# Patient Record
Sex: FEMALE | Race: WHITE | NOT HISPANIC OR LATINO | Employment: FULL TIME | URBAN - METROPOLITAN AREA
[De-identification: names, ages, dates, MRNs, and addresses within clinical notes are randomized per-mention and may not be internally consistent; named-entity substitution may affect disease eponyms.]

---

## 2019-01-23 ENCOUNTER — OFFICE VISIT (OUTPATIENT)
Dept: URGENT CARE | Facility: CLINIC | Age: 24
End: 2019-01-23
Payer: COMMERCIAL

## 2019-01-23 VITALS
WEIGHT: 200 LBS | TEMPERATURE: 98 F | OXYGEN SATURATION: 100 % | DIASTOLIC BLOOD PRESSURE: 78 MMHG | RESPIRATION RATE: 16 BRPM | SYSTOLIC BLOOD PRESSURE: 130 MMHG | HEART RATE: 86 BPM | HEIGHT: 68 IN | BODY MASS INDEX: 30.31 KG/M2

## 2019-01-23 DIAGNOSIS — R06.02 SOB (SHORTNESS OF BREATH): Primary | ICD-10-CM

## 2019-01-23 PROCEDURE — 99203 OFFICE O/P NEW LOW 30 MIN: CPT | Performed by: PHYSICIAN ASSISTANT

## 2019-01-23 RX ORDER — NORETHINDRONE ACETATE AND ETHINYL ESTRADIOL 1MG-20(21)
1 KIT ORAL DAILY
COMMUNITY

## 2019-01-23 RX ORDER — OMEPRAZOLE 40 MG/1
40 CAPSULE, DELAYED RELEASE ORAL DAILY
COMMUNITY
End: 2022-05-08

## 2019-01-23 NOTE — PROGRESS NOTES
[facility]        NAME: Artur Srivastava is a 21 y o  female  : 1995    MRN: [id number]  DATE: 2019  TIME: 10:24 AM    Assessment and Plan   SOB (shortness of breath) [R06 02]  1  SOB (shortness of breath)  Transfer to other facility     Patient Instructions     Patient to reading ED for further evaluation    Chief Complaint     Chief Complaint   Patient presents with    Shortness of Breath     had an egd yesterday and received profanol  automatically after getting profanol got sob  It never went away  History of Present Illness     Patient had EGD yesterday  Patient has experienced burning in chest and inability to take full breath since procedure  Patient states that she called anesthesiologist and he instructed her to go to ED for workup due to possible aspiration  Patient presents here instead as sx have not resolved  Shortness of Breath   This is a new problem  The current episode started yesterday  The problem occurs constantly  The problem has been unchanged  Pertinent negatives include no abdominal pain, chest pain, claudication, coryza, ear pain, fever, headaches, hemoptysis, leg pain, leg swelling, neck pain, orthopnea, PND, rash, rhinorrhea, sore throat, sputum production, swollen glands, syncope, vomiting or wheezing  Nothing aggravates the symptoms  She has tried beta agonist inhalers for the symptoms  The treatment provided no relief  There is no history of allergies, aspirin allergies, asthma, bronchiolitis, CAD, chronic lung disease, COPD, DVT, a heart failure, PE, pneumonia or a recent surgery  Review of Systems   Review of Systems   Constitutional: Negative for activity change, appetite change, chills, diaphoresis, fatigue, fever and unexpected weight change  HENT: Negative for ear pain, rhinorrhea and sore throat  Respiratory: Positive for chest tightness and shortness of breath   Negative for apnea, cough, hemoptysis, sputum production, choking, wheezing and stridor  Cardiovascular: Negative for chest pain, orthopnea, claudication, leg swelling, syncope and PND  Gastrointestinal: Negative for abdominal pain and vomiting  Musculoskeletal: Negative for neck pain  Skin: Negative for rash  Neurological: Negative for headaches  Current Medications       Current Outpatient Prescriptions:     norethindrone-ethinyl estradiol (JUNEL FE 1/20) 1-20 MG-MCG per tablet, Take 1 tablet by mouth daily, Disp: , Rfl:     omeprazole (PriLOSEC) 40 MG capsule, Take 40 mg by mouth daily, Disp: , Rfl:     Current Allergies     Allergies as of 01/23/2019 - never reviewed   Allergen Reaction Noted    Sulfa antibiotics Hives 01/23/2019            The following portions of the patient's history were reviewed and updated as appropriate: allergies, current medications, past family history, past medical history, past social history, past surgical history and problem list      Past Medical History:   Diagnosis Date    Endometriosis     Peptic ulcer        Past Surgical History:   Procedure Laterality Date    ADENOIDECTOMY      APPENDECTOMY      TONSILLECTOMY         Family History   Problem Relation Age of Onset    No Known Problems Mother     No Known Problems Father      Medications have been verified  Objective   /78   Pulse 86   Temp 98 °F (36 7 °C) (Tympanic)   Resp 16   Ht 5' 8" (1 727 m)   Wt 90 7 kg (200 lb)   LMP 11/23/2018   SpO2 100%   BMI 30 41 kg/m²        Physical Exam     Physical Exam   Constitutional: She appears well-developed and well-nourished  Cardiovascular: Normal rate, regular rhythm and intact distal pulses  Exam reveals no gallop and no friction rub  No murmur heard  Pulmonary/Chest: Effort normal  No respiratory distress  She has no decreased breath sounds  She has wheezes (scattered)  She has no rhonchi  She has no rales     Friction rub auscultated at bases of lungs; patient unable to take full inspiration   Abdominal: Soft  Bowel sounds are normal  She exhibits no distension  There is no tenderness  There is no rebound and no guarding

## 2020-09-19 ENCOUNTER — HOSPITAL ENCOUNTER (EMERGENCY)
Facility: HOSPITAL | Age: 25
Discharge: HOME | End: 2020-09-19
Attending: EMERGENCY MEDICINE
Payer: COMMERCIAL

## 2020-09-19 VITALS
BODY MASS INDEX: 38.58 KG/M2 | RESPIRATION RATE: 18 BRPM | HEART RATE: 103 BPM | TEMPERATURE: 97.1 F | DIASTOLIC BLOOD PRESSURE: 98 MMHG | OXYGEN SATURATION: 98 % | WEIGHT: 253.75 LBS | SYSTOLIC BLOOD PRESSURE: 135 MMHG
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
## 2020-09-19 DIAGNOSIS — S61.451A ANIMAL BITE OF RIGHT HAND WITH INFECTION, INITIAL ENCOUNTER: ICD-10-CM

## 2020-09-19 DIAGNOSIS — L08.9 ANIMAL BITE OF RIGHT HAND WITH INFECTION, INITIAL ENCOUNTER: ICD-10-CM

## 2020-09-19 PROCEDURE — 99283 EMERGENCY DEPT VISIT LOW MDM: CPT

## 2020-09-19 PROCEDURE — 99284 EMERGENCY DEPT VISIT MOD MDM: CPT | Performed by: EMERGENCY MEDICINE

## 2020-09-19 RX ORDER — OXYCODONE HYDROCHLORIDE AND ACETAMINOPHEN 5; 325 MG/1; MG/1
1 TABLET ORAL ONCE
Status: COMPLETED | OUTPATIENT
Start: 2020-09-19 | End: 2020-09-19

## 2020-09-19 RX ORDER — AMOXICILLIN AND CLAVULANATE POTASSIUM 875; 125 MG/1; MG/1
1 TABLET, FILM COATED ORAL ONCE
Status: COMPLETED | OUTPATIENT
Start: 2020-09-19 | End: 2020-09-19

## 2020-09-19 RX ORDER — AMOXICILLIN AND CLAVULANATE POTASSIUM 875; 125 MG/1; MG/1
1 TABLET, FILM COATED ORAL EVERY 12 HOURS
Qty: 20 TABLET | Refills: 0 | Status: SHIPPED | OUTPATIENT
Start: 2020-09-19 | End: 2020-09-19 | Stop reason: SDUPTHER

## 2020-09-19 RX ADMIN — AMOXICILLIN AND CLAVULANATE POTASSIUM 1 TABLET: 875; 125 TABLET, FILM COATED ORAL at 23:14

## 2020-09-19 RX ADMIN — OXYCODONE HYDROCHLORIDE AND ACETAMINOPHEN 1 TABLET: 5; 325 TABLET ORAL at 23:17

## 2021-04-15 ENCOUNTER — IMMUNIZATIONS (OUTPATIENT)
Dept: FAMILY MEDICINE CLINIC | Facility: HOSPITAL | Age: 26
End: 2021-04-15

## 2021-04-15 DIAGNOSIS — Z23 ENCOUNTER FOR IMMUNIZATION: Primary | ICD-10-CM

## 2021-04-15 PROCEDURE — 91300 SARS-COV-2 / COVID-19 MRNA VACCINE (PFIZER-BIONTECH) 30 MCG: CPT

## 2021-04-15 PROCEDURE — 0001A SARS-COV-2 / COVID-19 MRNA VACCINE (PFIZER-BIONTECH) 30 MCG: CPT

## 2021-05-06 ENCOUNTER — IMMUNIZATIONS (OUTPATIENT)
Dept: FAMILY MEDICINE CLINIC | Facility: HOSPITAL | Age: 26
End: 2021-05-06

## 2021-05-06 DIAGNOSIS — Z23 ENCOUNTER FOR IMMUNIZATION: Primary | ICD-10-CM

## 2021-05-06 PROCEDURE — 0002A SARS-COV-2 / COVID-19 MRNA VACCINE (PFIZER-BIONTECH) 30 MCG: CPT

## 2021-05-06 PROCEDURE — 91300 SARS-COV-2 / COVID-19 MRNA VACCINE (PFIZER-BIONTECH) 30 MCG: CPT

## 2021-11-21 ENCOUNTER — OFFICE VISIT (OUTPATIENT)
Dept: URGENT CARE | Facility: CLINIC | Age: 26
End: 2021-11-21
Payer: COMMERCIAL

## 2021-11-21 VITALS
WEIGHT: 250 LBS | RESPIRATION RATE: 16 BRPM | HEART RATE: 110 BPM | OXYGEN SATURATION: 96 % | BODY MASS INDEX: 37.89 KG/M2 | TEMPERATURE: 99.1 F | HEIGHT: 68 IN

## 2021-11-21 DIAGNOSIS — J01.00 ACUTE MAXILLARY SINUSITIS, RECURRENCE NOT SPECIFIED: Primary | ICD-10-CM

## 2021-11-21 PROCEDURE — U0005 INFEC AGEN DETEC AMPLI PROBE: HCPCS | Performed by: PHYSICIAN ASSISTANT

## 2021-11-21 PROCEDURE — U0003 INFECTIOUS AGENT DETECTION BY NUCLEIC ACID (DNA OR RNA); SEVERE ACUTE RESPIRATORY SYNDROME CORONAVIRUS 2 (SARS-COV-2) (CORONAVIRUS DISEASE [COVID-19]), AMPLIFIED PROBE TECHNIQUE, MAKING USE OF HIGH THROUGHPUT TECHNOLOGIES AS DESCRIBED BY CMS-2020-01-R: HCPCS | Performed by: PHYSICIAN ASSISTANT

## 2021-11-21 PROCEDURE — 99213 OFFICE O/P EST LOW 20 MIN: CPT | Performed by: PHYSICIAN ASSISTANT

## 2021-11-21 RX ORDER — FLUTICASONE PROPIONATE 50 MCG
SPRAY, SUSPENSION (ML) NASAL
COMMUNITY
Start: 2021-08-28

## 2021-11-21 RX ORDER — AMOXICILLIN AND CLAVULANATE POTASSIUM 875; 125 MG/1; MG/1
1 TABLET, FILM COATED ORAL EVERY 12 HOURS SCHEDULED
Qty: 14 TABLET | Refills: 0 | Status: SHIPPED | OUTPATIENT
Start: 2021-11-21 | End: 2021-11-28

## 2021-11-22 LAB — SARS-COV-2 RNA RESP QL NAA+PROBE: NEGATIVE

## 2022-01-14 ENCOUNTER — NURSE TRIAGE (OUTPATIENT)
Dept: OTHER | Facility: OTHER | Age: 27
End: 2022-01-14

## 2022-01-14 DIAGNOSIS — Z20.828 SARS-ASSOCIATED CORONAVIRUS EXPOSURE: Primary | ICD-10-CM

## 2022-01-14 PROCEDURE — U0003 INFECTIOUS AGENT DETECTION BY NUCLEIC ACID (DNA OR RNA); SEVERE ACUTE RESPIRATORY SYNDROME CORONAVIRUS 2 (SARS-COV-2) (CORONAVIRUS DISEASE [COVID-19]), AMPLIFIED PROBE TECHNIQUE, MAKING USE OF HIGH THROUGHPUT TECHNOLOGIES AS DESCRIBED BY CMS-2020-01-R: HCPCS | Performed by: FAMILY MEDICINE

## 2022-01-14 PROCEDURE — U0005 INFEC AGEN DETEC AMPLI PROBE: HCPCS | Performed by: FAMILY MEDICINE

## 2022-01-14 NOTE — TELEPHONE ENCOUNTER
Reason for Disposition   [1] COVID-19 infection suspected by caller or triager AND [2] mild symptoms (cough, fever, or others) AND [3] has not gotten tested yet    Answer Assessment - Initial Assessment Questions  Were you within 6 feet or less, for up to 15 minutes or more with a person that has a confirmed COVID-19 test? yes  What was the date of your exposure?  Household exposure  Are you experiencing any symptoms attributed to the virus?  (Assess for SOB, cough, fever, difficulty breathing) sore throat  HIGH RISK: Do you have any history heart or lung conditions, weakened immune system, diabetes, Asthma, CHF, HIV, COPD, Chemo, renal failure, sickle cell, etc? no  PREGNANCY: Are you pregnant or did you recently give birth? no    Protocols used: CORONAVIRUS (COVID-19) DIAGNOSED OR SUSPECTED-ADULT-

## 2022-02-20 ENCOUNTER — NURSE TRIAGE (OUTPATIENT)
Dept: OTHER | Facility: OTHER | Age: 27
End: 2022-02-20

## 2022-02-20 NOTE — TELEPHONE ENCOUNTER
Regarding: Covid symptomatic cough  ----- Message from Shira Aquino sent at 2/20/2022  1:38 PM EST -----  "I would like to be tested for covid   I have a fever, sore throat, phlegm, and a cough "

## 2022-02-20 NOTE — TELEPHONE ENCOUNTER
Patient feeling sick and looking for covid test  Patient just tested positive 1/14/2022  Advised we do not re-test within 90 days  Encouraged patient to call PCP to be evaluated

## 2022-05-08 ENCOUNTER — OFFICE VISIT (OUTPATIENT)
Dept: URGENT CARE | Facility: CLINIC | Age: 27
End: 2022-05-08
Payer: COMMERCIAL

## 2022-05-08 VITALS
RESPIRATION RATE: 18 BRPM | BODY MASS INDEX: 37.89 KG/M2 | DIASTOLIC BLOOD PRESSURE: 99 MMHG | HEIGHT: 68 IN | TEMPERATURE: 97.7 F | HEART RATE: 109 BPM | OXYGEN SATURATION: 98 % | SYSTOLIC BLOOD PRESSURE: 139 MMHG | WEIGHT: 250 LBS

## 2022-05-08 DIAGNOSIS — J01.00 ACUTE NON-RECURRENT MAXILLARY SINUSITIS: ICD-10-CM

## 2022-05-08 DIAGNOSIS — Z20.822 EXPOSURE TO COVID-19 VIRUS: Primary | ICD-10-CM

## 2022-05-08 DIAGNOSIS — J06.9 VIRAL URI WITH COUGH: ICD-10-CM

## 2022-05-08 PROCEDURE — G0382 LEV 3 HOSP TYPE B ED VISIT: HCPCS | Performed by: EMERGENCY MEDICINE

## 2022-05-08 PROCEDURE — 87636 SARSCOV2 & INF A&B AMP PRB: CPT | Performed by: EMERGENCY MEDICINE

## 2022-05-08 RX ORDER — PREDNISONE 10 MG/1
TABLET ORAL
Qty: 27 TABLET | Refills: 0 | Status: SHIPPED | OUTPATIENT
Start: 2022-05-08

## 2022-05-08 RX ORDER — ALBUTEROL SULFATE 90 UG/1
2 AEROSOL, METERED RESPIRATORY (INHALATION) EVERY 6 HOURS PRN
Qty: 8.5 G | Refills: 0 | Status: SHIPPED | OUTPATIENT
Start: 2022-05-08

## 2022-05-08 RX ORDER — CYCLOBENZAPRINE HCL 10 MG
10 TABLET ORAL
COMMUNITY
Start: 2021-11-16

## 2022-05-08 NOTE — PROGRESS NOTES
[email or web address]        NAME: Irma Paulino is a 32 y o  female  : 1995    MRN: [id number]  DATE: May 8, 2022  TIME: 11:12 AM    Assessment and Plan   Exposure to COVID-19 virus [Z20 822]  1  Exposure to COVID-19 virus  Covid/Flu-Office Collect   2  Viral URI with cough  predniSONE 10 mg tablet    albuterol (ProAir HFA) 90 mcg/act inhaler         Patient Instructions     Patient Instructions   You have been diagnosed with a Viral Upper Respiratory infection and your symptoms should resolve over the next 7 to 10 days with the treatments recommended today  If they do not, it is possible that you have developed a bacterial infection and you should return  If you were to take an antibiotic while you are still in the viral stage, you will not get better any faster, but could kill off good germs in your body as well as make germs resistant to the antibiotic  Take an expectorant - guaifenesin should be the only ingredient - during the day, and the cough suppressant (ex  Robitussin DM or Tessalon) if needed at night only  Take Zinc 50 mg every 12 hours for the next week  You should also take Quercetin 500 mg twice daily  You should also take vitamin D 3 5000 i u s per day for the next 1 week, and vitamin-C 1 g daily  You may use Flonase as discussed  You may take a decongestant like Sudafed, unless you have hypertension or cardiac disease  Upper Respiratory Infection   AMBULATORY CARE:   An upper respiratory infection  is also called a common cold  It can affect your nose, throat, ears, and sinuses  Common signs and symptoms include the following:  Cold symptoms are usually worst for the first 3 to 5 days  You may have any of the following:  Runny or stuffy nose  Sneezing and coughing  Sore throat or hoarseness  Red, watery, and sore eyes  Fatigue   Chills and fever  Headache, body aches, or sore muscles  Seek care immediately if:   You have chest pain or trouble breathing      Contact your healthcare provider if:   You have a fever over 102ºF (39°C)  Your sore throat gets worse or you see white or yellow spots in your throat  Your symptoms get worse after 3 to 5 days or your cold is not better in 14 days  You have a rash anywhere on your skin  You have large, tender lumps in your neck  You have thick, green or yellow drainage from your nose  You cough up thick yellow, green, or bloody mucus  You have vomiting for more than 24 hours and cannot keep fluids down  You have a bad earache  You have questions or concerns about your condition or care  Treatment for a cold: There is no cure for the common cold  Colds are caused by viruses and do not get better with antibiotics  Most people get better in 7 to 14 days  You may continue to cough for 2 to 3 weeks  The following may help decrease your symptoms:  Decongestants  help reduce nasal congestion and help you breathe more easily  If you take decongestant pills, they may make you feel restless or not able to sleep  Do not use decongestant sprays for more than a few days  Cough suppressants  help reduce coughing  Ask your healthcare provider which type of cough medicine is best for you  NSAIDs , such as ibuprofen, help decrease swelling, pain, and fever  NSAIDs can cause stomach bleeding or kidney problems in certain people  If you take blood thinner medicine, always ask your healthcare provider if NSAIDs are safe for you  Always read the medicine label and follow directions  Acetaminophen  decreases pain and fever  It is available without a doctor's order  Ask how much to take and how often to take it  Follow directions  Read the labels of all other medicines you are using to see if they also contain acetaminophen, or ask your doctor or pharmacist  Acetaminophen can cause liver damage if not taken correctly  Do not use more than 4 grams (4,000 milligrams) total of acetaminophen in one day    Manage your cold:   Rest as much as possible  Slowly start to do more each day  Drink more liquids as directed  Liquids will help thin and loosen mucus so you can cough it up  Liquids will also help prevent dehydration  Liquids that help prevent dehydration include water, fruit juice, and broth  Do not drink liquids that contain caffeine  Caffeine can increase your risk for dehydration  Ask your healthcare provider how much liquid to drink each day  Soothe a sore throat  Gargle with warm salt water  This helps your sore throat feel better  Make salt water by dissolving ¼ teaspoon salt in 1 cup warm water  You may also suck on hard candy or throat lozenges  You may use a sore throat spray  Use a humidifier or vaporizer  Use a cool mist humidifier or a vaporizer to increase air moisture in your home  This may make it easier for you to breathe and help decrease your cough  Use saline nasal drops as directed  These help relieve congestion  Apply petroleum-based jelly around the outside of your nostrils  This can decrease irritation from blowing your nose  Do not smoke  Nicotine and other chemicals in cigarettes and cigars can make your symptoms worse  They can also cause infections such as bronchitis or pneumonia  Ask your healthcare provider for information if you currently smoke and need help to quit  E-cigarettes or smokeless tobacco still contain nicotine  Talk to your healthcare provider before you use these products  Prevent spreading your cold to others:   Try to stay away from other people during the first 2 to 3 days of your cold when it is more easily spread  Do not share food or drinks  Do not share hand towels with household members  Wash your hands often, especially after you blow your nose  Turn away from other people and cover your mouth and nose with a tissue when you sneeze or cough    Follow up with your healthcare provider as directed:  Write down your questions so you remember to ask them during your visits  © 2017 [facility] Information is for End User's use only and may not be sold, redistributed or otherwise used for commercial purposes  All illustrations and images included in CareNotes® are the copyrighted property of A D A M , Inc  or Jhony Portillo  The above information is an  only  It is not intended as medical advice for individual conditions or treatments  Talk to your doctor, nurse or pharmacist before following any medical regimen to see if it is safe and effective for you  [address] [place] [address]     Your healthcare provider and/or public health staff have evaluated you and have determined that you do not need to be hospitalized at this time  At this time you can be isolated at home where you will be monitored by staff from your local or state health department  You should carefully follow the prevention and isolation steps below until a healthcare provider or local or state health department says that you can return to your normal activities  Stay home except to get medical care     People who are mildly ill with COVID-19 are able to isolate at home during their illness  You should restrict activities outside your home, except for getting medical care  Do not go to work, school, or public areas  Avoid using public transportation, ride-sharing, or taxis  Separate yourself from other people and animals in your home     People: As much as possible, you should stay in a specific room and away from other people in your home  Also, you should use a separate bathroom, if available  Animals: You should restrict contact with pets and other animals while you are sick with COVID-19, just like you would around other people   Although there have not been reports of pets or other animals becoming sick with COVID-19, it is still recommended that people sick with COVID-19 limit contact with animals until more information is known about the virus  When possible, have another member of your household care for your animals while you are sick  If you are sick with COVID-19, avoid contact with your pet, including petting, snuggling, being kissed or licked, and sharing food  If you must care for your pet or be around animals while you are sick, wash your hands before and after you interact with pets and wear a facemask  See COVID-19 and Animals for more information  Call ahead before visiting your doctor     If you have a medical appointment, call the healthcare provider and tell them that you have or may have COVID-19  This will help the healthcare providers office take steps to keep other people from getting infected or exposed  Wear a facemask     You should wear a facemask when you are around other people (e g , sharing a room or vehicle) or pets and before you enter a healthcare providers office  If you are not able to wear a facemask (for example, because it causes trouble breathing), then people who live with you should not stay in the same room with you, or they should wear a facemask if they enter your room  Cover your coughs and sneezes     Cover your mouth and nose with a tissue when you cough or sneeze  Throw used tissues in a lined trash can  Immediately wash your hands with soap and water for at least 20 seconds or, if soap and water are not available, clean your hands with an alcohol-based hand  that contains at least 60% alcohol  Clean your hands often     Wash your hands often with soap and water for at least 20 seconds, especially after blowing your nose, coughing, or sneezing; going to the bathroom; and before eating or preparing food  If soap and water are not readily available, use an alcohol-based hand  with at least 60% alcohol, covering all surfaces of your hands and rubbing them together until they feel dry  Soap and water are the best option if hands are visibly dirty   Avoid touching your eyes, nose, and mouth with unwashed hands  Avoid sharing personal household items     You should not share dishes, drinking glasses, cups, eating utensils, towels, or bedding with other people or pets in your home  After using these items, they should be washed thoroughly with soap and water  Clean all high-touch surfaces everyday     High touch surfaces include counters, tabletops, doorknobs, bathroom fixtures, toilets, phones, keyboards, tablets, and bedside tables  Also, clean any surfaces that may have blood, stool, or body fluids on them  Use a household cleaning spray or wipe, according to the label instructions  Labels contain instructions for safe and effective use of the cleaning product including precautions you should take when applying the product, such as wearing gloves and making sure you have good ventilation during use of the product  Monitor your symptoms     Seek prompt medical attention if your illness is worsening (e g , difficulty breathing)  Before seeking care, call your healthcare provider and tell them that you have, or are being evaluated for, COVID-19  Put on a facemask before you enter the facility  These steps will help the healthcare providers office to keep other people in the office or waiting room from getting infected or exposed  Ask your healthcare provider to call the local or [facility] health department  Persons who are placed under active monitoring or facilitated self-monitoring should follow instructions provided by their local health department or occupational health professionals, as appropriate  If you have a medical emergency and need to call 911, notify the dispatch personnel that you have, or are being evaluated for COVID-19  If possible, put on a facemask before emergency medical services arrive       Discontinuing home isolation     Patients with confirmed COVID-19 should remain under home isolation precautions until the risk of secondary transmission to others is thought to be low  The decision to discontinue home isolation precautions should be made on a case-by-case basis, in consultation with healthcare providers and state and local health departments  Source: [employer]  Proceed to ER if symptoms worsen  Follow up with PCP in 3-5 days  Proceed to  ER if symptoms worsen  Chief Complaint     Chief Complaint   Patient presents with    COVID-19     Exposed to covid family member last [de-identified] and she started with symptoms yesterday and feels chest heaviness fever          History of Present Illness       Patient complains of cough, congestion, fevers, chest tightness since yesterday  She has ran out of her albuterol inhaler  She had a COVID exposure last week  Review of Systems   Review of Systems   Constitutional: Negative for appetite change, chills, fatigue and fever  HENT: Positive for congestion, rhinorrhea, sinus pressure and sore throat  Negative for trouble swallowing and voice change  Respiratory: Positive for cough and chest tightness  Negative for shortness of breath and wheezing  Cardiovascular: Negative for chest pain  Gastrointestinal: Negative for nausea  Musculoskeletal: Negative for myalgias           Current Medications       Current Outpatient Medications:     cyclobenzaprine (FLEXERIL) 10 mg tablet, Take 10 mg by mouth, Disp: , Rfl:     albuterol (ProAir HFA) 90 mcg/act inhaler, Inhale 2 puffs every 6 (six) hours as needed for shortness of breath, Disp: 8 5 g, Rfl: 0    fluticasone (FLONASE) 50 mcg/act nasal spray, , Disp: , Rfl:     norethindrone-ethinyl estradiol (JUNEL FE 1/20) 1-20 MG-MCG per tablet, Take 1 tablet by mouth daily, Disp: , Rfl:     predniSONE 10 mg tablet, Take once daily all days pills on this schedule 6- 6- 5- 4- 3- 2- 1, Disp: 27 tablet, Rfl: 0    Current Allergies     Allergies as of 05/08/2022 - Reviewed 05/08/2022 Allergen Reaction Noted    Sulfa antibiotics Hives 01/23/2019            The following portions of the patient's history were reviewed and updated as appropriate: allergies, current medications, past family history, past medical history, past social history, past surgical history and problem list      Past Medical History:   Diagnosis Date    Endometriosis     Peptic ulcer        Past Surgical History:   Procedure Laterality Date    ADENOIDECTOMY      APPENDECTOMY      TONSILLECTOMY         Family History   Problem Relation Age of Onset    No Known Problems Mother     No Known Problems Father          Medications have been verified  Objective   /99   Pulse (!) 109   Temp 97 7 °F (36 5 °C)   Resp 18   Ht 5' 8" (1 727 m)   Wt 113 kg (250 lb)   SpO2 98%   BMI 38 01 kg/m²        Physical Exam     Physical Exam  Vitals and nursing note reviewed  Constitutional:       General: She is not in acute distress  Appearance: She is well-developed  HENT:      Head: Normocephalic and atraumatic  Nose: Mucosal edema and congestion present  Mouth/Throat:      Pharynx: Posterior oropharyngeal erythema present  No oropharyngeal exudate  Tonsils: No tonsillar abscesses  Cardiovascular:      Rate and Rhythm: Normal rate and regular rhythm  Pulmonary:      Effort: Pulmonary effort is normal  No respiratory distress  Breath sounds: No wheezing, rhonchi or rales  Musculoskeletal:      Cervical back: Neck supple  Skin:     General: Skin is warm and dry  Neurological:      Mental Status: She is alert and oriented to person, place, and time  Psychiatric:         Mood and Affect: Mood normal          Behavior: Behavior normal          Thought Content:  Thought content normal          Judgment: Judgment normal

## 2022-05-08 NOTE — PATIENT INSTRUCTIONS
You have been diagnosed with a Viral Upper Respiratory infection and your symptoms should resolve over the next 7 to 10 days with the treatments recommended today  If they do not, it is possible that you have developed a bacterial infection and you should return  If you were to take an antibiotic while you are still in the viral stage, you will not get better any faster, but could kill off good germs in your body as well as make germs resistant to the antibiotic  Take an expectorant - guaifenesin should be the only ingredient - during the day, and the cough suppressant (ex  Robitussin DM or Tessalon) if needed at night only  Take Zinc 50 mg every 12 hours for the next week  You should also take Quercetin 500 mg twice daily  You should also take vitamin D 3 5000 i u s per day for the next 1 week, and vitamin-C 1 g daily  You may use Flonase as discussed  You may take a decongestant like Sudafed, unless you have hypertension or cardiac disease  Upper Respiratory Infection   AMBULATORY CARE:   An upper respiratory infection  is also called a common cold  It can affect your nose, throat, ears, and sinuses  Common signs and symptoms include the following:  Cold symptoms are usually worst for the first 3 to 5 days  You may have any of the following:  Runny or stuffy nose  Sneezing and coughing  Sore throat or hoarseness  Red, watery, and sore eyes  Fatigue   Chills and fever  Headache, body aches, or sore muscles  Seek care immediately if:   You have chest pain or trouble breathing  Contact your healthcare provider if:   You have a fever over 102ºF (39°C)  Your sore throat gets worse or you see white or yellow spots in your throat  Your symptoms get worse after 3 to 5 days or your cold is not better in 14 days  You have a rash anywhere on your skin  You have large, tender lumps in your neck  You have thick, green or yellow drainage from your nose    You cough up thick yellow, green, or bloody mucus   You have vomiting for more than 24 hours and cannot keep fluids down  You have a bad earache  You have questions or concerns about your condition or care  Treatment for a cold: There is no cure for the common cold  Colds are caused by viruses and do not get better with antibiotics  Most people get better in 7 to 14 days  You may continue to cough for 2 to 3 weeks  The following may help decrease your symptoms:  Decongestants  help reduce nasal congestion and help you breathe more easily  If you take decongestant pills, they may make you feel restless or not able to sleep  Do not use decongestant sprays for more than a few days  Cough suppressants  help reduce coughing  Ask your healthcare provider which type of cough medicine is best for you  NSAIDs , such as ibuprofen, help decrease swelling, pain, and fever  NSAIDs can cause stomach bleeding or kidney problems in certain people  If you take blood thinner medicine, always ask your healthcare provider if NSAIDs are safe for you  Always read the medicine label and follow directions  Acetaminophen  decreases pain and fever  It is available without a doctor's order  Ask how much to take and how often to take it  Follow directions  Read the labels of all other medicines you are using to see if they also contain acetaminophen, or ask your doctor or pharmacist  Acetaminophen can cause liver damage if not taken correctly  Do not use more than 4 grams (4,000 milligrams) total of acetaminophen in one day  Manage your cold:   Rest as much as possible  Slowly start to do more each day  Drink more liquids as directed  Liquids will help thin and loosen mucus so you can cough it up  Liquids will also help prevent dehydration  Liquids that help prevent dehydration include water, fruit juice, and broth  Do not drink liquids that contain caffeine  Caffeine can increase your risk for dehydration   Ask your healthcare provider how much liquid to drink each day      Soothe a sore throat  Gargle with warm salt water  This helps your sore throat feel better  Make salt water by dissolving ¼ teaspoon salt in 1 cup warm water  You may also suck on hard candy or throat lozenges  You may use a sore throat spray  Use a humidifier or vaporizer  Use a cool mist humidifier or a vaporizer to increase air moisture in your home  This may make it easier for you to breathe and help decrease your cough  Use saline nasal drops as directed  These help relieve congestion  Apply petroleum-based jelly around the outside of your nostrils  This can decrease irritation from blowing your nose  Do not smoke  Nicotine and other chemicals in cigarettes and cigars can make your symptoms worse  They can also cause infections such as bronchitis or pneumonia  Ask your healthcare provider for information if you currently smoke and need help to quit  E-cigarettes or smokeless tobacco still contain nicotine  Talk to your healthcare provider before you use these products  Prevent spreading your cold to others:   Try to stay away from other people during the first 2 to 3 days of your cold when it is more easily spread  Do not share food or drinks  Do not share hand towels with household members  Wash your hands often, especially after you blow your nose  Turn away from other people and cover your mouth and nose with a tissue when you sneeze or cough  Follow up with your healthcare provider as directed:  Write down your questions so you remember to ask them during your visits  © 2017 [address] [employer] Information is for End User's use only and may not be sold, redistributed or otherwise used for commercial purposes  All illustrations and images included in CareNotes® are the copyrighted property of [employer] , [employer]  or Jhony Portillo  The above information is an  only   It is not intended as medical advice for individual conditions or treatments  Talk to your doctor, nurse or pharmacist before following any medical regimen to see if it is safe and effective for you  [address]     Your healthcare provider and/or public health staff have evaluated you and have determined that you do not need to be hospitalized at this time  At this time you can be isolated at home where you will be monitored by staff from your local or state health department  You should carefully follow the prevention and isolation steps below until a healthcare provider or local or state health department says that you can return to your normal activities  Stay home except to get medical care     People who are mildly ill with COVID-19 are able to isolate at home during their illness  You should restrict activities outside your home, except for getting medical care  Do not go to work, school, or public areas  Avoid using public transportation, ride-sharing, or taxis  Separate yourself from other people and animals in your home     People: As much as possible, you should stay in a specific room and away from other people in your home  Also, you should use a separate bathroom, if available  Animals: You should restrict contact with pets and other animals while you are sick with COVID-19, just like you would around other people  Although there have not been reports of pets or other animals becoming sick with COVID-19, it is still recommended that people sick with COVID-19 limit contact with animals until more information is known about the virus  When possible, have another member of your household care for your animals while you are sick  If you are sick with COVID-19, avoid contact with your pet, including petting, snuggling, being kissed or licked, and sharing food  If you must care for your pet or be around animals while you are sick, wash your hands before and after you interact with pets and wear a facemask   See COVID-19 and Animals for more information  Call ahead before visiting your doctor     If you have a medical appointment, call the healthcare provider and tell them that you have or may have COVID-19  This will help the healthcare providers office take steps to keep other people from getting infected or exposed  Wear a facemask     You should wear a facemask when you are around other people (e g , sharing a room or vehicle) or pets and before you enter a healthcare providers office  If you are not able to wear a facemask (for example, because it causes trouble breathing), then people who live with you should not stay in the same room with you, or they should wear a facemask if they enter your room  Cover your coughs and sneezes     Cover your mouth and nose with a tissue when you cough or sneeze  Throw used tissues in a lined trash can  Immediately wash your hands with soap and water for at least 20 seconds or, if soap and water are not available, clean your hands with an alcohol-based hand  that contains at least 60% alcohol  Clean your hands often     Wash your hands often with soap and water for at least 20 seconds, especially after blowing your nose, coughing, or sneezing; going to the bathroom; and before eating or preparing food  If soap and water are not readily available, use an alcohol-based hand  with at least 60% alcohol, covering all surfaces of your hands and rubbing them together until they feel dry  Soap and water are the best option if hands are visibly dirty  Avoid touching your eyes, nose, and mouth with unwashed hands  Avoid sharing personal household items     You should not share dishes, drinking glasses, cups, eating utensils, towels, or bedding with other people or pets in your home  After using these items, they should be washed thoroughly with soap and water       Clean all high-touch surfaces everyday     High touch surfaces include counters, tabletops, doorknobs, bathroom fixtures, toilets, phones, keyboards, tablets, and bedside tables  Also, clean any surfaces that may have blood, stool, or body fluids on them  Use a household cleaning spray or wipe, according to the label instructions  Labels contain instructions for safe and effective use of the cleaning product including precautions you should take when applying the product, such as wearing gloves and making sure you have good ventilation during use of the product  Monitor your symptoms     Seek prompt medical attention if your illness is worsening (e g , difficulty breathing)  Before seeking care, call your healthcare provider and tell them that you have, or are being evaluated for, COVID-19  Put on a facemask before you enter the facility  These steps will help the healthcare providers office to keep other people in the office or waiting room from getting infected or exposed  Ask your healthcare provider to call the local or state health department  Persons who are placed under active monitoring or facilitated self-monitoring should follow instructions provided by their local health department or occupational health professionals, as appropriate  If you have a medical emergency and need to call 911, notify the dispatch personnel that you have, or are being evaluated for COVID-19  If possible, put on a facemask before emergency medical services arrive  Discontinuing home isolation     Patients with confirmed COVID-19 should remain under home isolation precautions until the risk of secondary transmission to others is thought to be low  The decision to discontinue home isolation precautions should be made on a case-by-case basis, in consultation with healthcare providers and state and local health departments  Source: [employer]  Proceed to ER if symptoms worsen

## 2022-05-09 LAB
FLUAV RNA RESP QL NAA+PROBE: NEGATIVE
FLUBV RNA RESP QL NAA+PROBE: NEGATIVE
SARS-COV-2 RNA RESP QL NAA+PROBE: NEGATIVE

## 2022-05-13 RX ORDER — DOXYCYCLINE 100 MG/1
100 TABLET ORAL 2 TIMES DAILY
Qty: 20 TABLET | Refills: 0 | Status: SHIPPED | OUTPATIENT
Start: 2022-05-13 | End: 2022-05-23

## 2022-07-22 ENCOUNTER — APPOINTMENT (OUTPATIENT)
Dept: LAB | Facility: CLINIC | Age: 27
End: 2022-07-22
Payer: COMMERCIAL

## 2022-07-22 DIAGNOSIS — Z00.00 ROUTINE GENERAL MEDICAL EXAMINATION AT A HEALTH CARE FACILITY: ICD-10-CM

## 2022-07-22 DIAGNOSIS — E66.09 OBESITY DUE TO EXCESS CALORIES, UNSPECIFIED CLASSIFICATION, UNSPECIFIED WHETHER SERIOUS COMORBIDITY PRESENT: Primary | ICD-10-CM

## 2022-07-22 LAB
ALBUMIN SERPL BCP-MCNC: 3.7 G/DL (ref 3.5–5)
ALP SERPL-CCNC: 81 U/L (ref 46–116)
ALT SERPL W P-5'-P-CCNC: 21 U/L (ref 12–78)
ANION GAP SERPL CALCULATED.3IONS-SCNC: 8 MMOL/L (ref 4–13)
AST SERPL W P-5'-P-CCNC: 19 U/L (ref 5–45)
BASOPHILS # BLD AUTO: 0.05 THOUSANDS/ΜL (ref 0–0.1)
BASOPHILS NFR BLD AUTO: 1 % (ref 0–1)
BILIRUB SERPL-MCNC: 0.25 MG/DL (ref 0.2–1)
BUN SERPL-MCNC: 13 MG/DL (ref 5–25)
CALCIUM SERPL-MCNC: 9.2 MG/DL (ref 8.3–10.1)
CHLORIDE SERPL-SCNC: 111 MMOL/L (ref 96–108)
CHOLEST SERPL-MCNC: 171 MG/DL
CO2 SERPL-SCNC: 24 MMOL/L (ref 21–32)
CREAT SERPL-MCNC: 0.8 MG/DL (ref 0.6–1.3)
EOSINOPHIL # BLD AUTO: 0.09 THOUSAND/ΜL (ref 0–0.61)
EOSINOPHIL NFR BLD AUTO: 1 % (ref 0–6)
ERYTHROCYTE [DISTWIDTH] IN BLOOD BY AUTOMATED COUNT: 12.7 % (ref 11.6–15.1)
GFR SERPL CREATININE-BSD FRML MDRD: 102 ML/MIN/1.73SQ M
GLUCOSE P FAST SERPL-MCNC: 106 MG/DL (ref 65–99)
HCT VFR BLD AUTO: 44.7 % (ref 34.8–46.1)
HDLC SERPL-MCNC: 43 MG/DL
HGB BLD-MCNC: 14.6 G/DL (ref 11.5–15.4)
IMM GRANULOCYTES # BLD AUTO: 0.03 THOUSAND/UL (ref 0–0.2)
IMM GRANULOCYTES NFR BLD AUTO: 0 % (ref 0–2)
LDLC SERPL CALC-MCNC: 109 MG/DL (ref 0–100)
LYMPHOCYTES # BLD AUTO: 3.19 THOUSANDS/ΜL (ref 0.6–4.47)
LYMPHOCYTES NFR BLD AUTO: 33 % (ref 14–44)
MCH RBC QN AUTO: 28.1 PG (ref 26.8–34.3)
MCHC RBC AUTO-ENTMCNC: 32.7 G/DL (ref 31.4–37.4)
MCV RBC AUTO: 86 FL (ref 82–98)
MONOCYTES # BLD AUTO: 0.74 THOUSAND/ΜL (ref 0.17–1.22)
MONOCYTES NFR BLD AUTO: 8 % (ref 4–12)
NEUTROPHILS # BLD AUTO: 5.6 THOUSANDS/ΜL (ref 1.85–7.62)
NEUTS SEG NFR BLD AUTO: 57 % (ref 43–75)
NRBC BLD AUTO-RTO: 0 /100 WBCS
PLATELET # BLD AUTO: 411 THOUSANDS/UL (ref 149–390)
PMV BLD AUTO: 10.4 FL (ref 8.9–12.7)
POTASSIUM SERPL-SCNC: 4.3 MMOL/L (ref 3.5–5.3)
PROT SERPL-MCNC: 7.6 G/DL (ref 6.4–8.4)
RBC # BLD AUTO: 5.19 MILLION/UL (ref 3.81–5.12)
SODIUM SERPL-SCNC: 143 MMOL/L (ref 135–147)
TRIGL SERPL-MCNC: 93 MG/DL
TSH SERPL DL<=0.05 MIU/L-ACNC: 1.38 UIU/ML (ref 0.45–4.5)
WBC # BLD AUTO: 9.7 THOUSAND/UL (ref 4.31–10.16)

## 2022-07-22 PROCEDURE — 85025 COMPLETE CBC W/AUTO DIFF WBC: CPT

## 2022-07-22 PROCEDURE — 80053 COMPREHEN METABOLIC PANEL: CPT

## 2022-07-22 PROCEDURE — 84443 ASSAY THYROID STIM HORMONE: CPT

## 2022-07-22 PROCEDURE — 80061 LIPID PANEL: CPT

## 2022-07-22 PROCEDURE — 36415 COLL VENOUS BLD VENIPUNCTURE: CPT

## 2022-08-25 ENCOUNTER — TELEPHONE (OUTPATIENT)
Dept: ADMINISTRATIVE | Facility: OTHER | Age: 27
End: 2022-08-25

## 2022-08-25 NOTE — TELEPHONE ENCOUNTER
Upon review of the In Basket request and the patient's chart, initial outreach has been made via fax, please see Contacts section for details       Thank you  Reymundo Conte MA

## 2022-08-25 NOTE — TELEPHONE ENCOUNTER
----- Message from Andrei Murillo sent at 8/25/2022  8:41 AM EDT -----  Regarding: care gap request  08/25/22 8:41 AM    Hello, our patient attached above has had Pap Smear (HPV) aka Cervical Cancer Screening completed/performed  Please assist in updating the patient chart by pulling the Care Everywhere (CE) document  The date of service is 11/16/2021       Thank you,  Andrei Murillo [facility]

## 2022-08-25 NOTE — LETTER
Procedure Request Form: Cervical Cancer Screening      Date Requested: 22  Patient: Winnie Grady  Patient : [id number]   Referring Provider: Eli Bonilla MD        Date of Procedure ______________________________       The above patient has informed us that they have completed their   most recent Cervical Cancer Screening at your facility  Please complete   this form and attach all corresponding procedure reports/results  Comments  Routine Cervical Smear documented DOS 2021 but no lab result document is in Care Everywhere  Would you release to us? Thank you   ____________________________________________________________________  ____________________________________________________________________  ____________________________________________________________________    Facility Completing Procedure _________________________________________    Form Completed By (print name) _______________________________________      Signature __________________________________________________________      These reports are needed for  compliance  Please fax this completed form and a copy of the procedure report to our office located at [facility] as soon as possible to [phone] attention Candido Martinez: Phone [phone]    We thank you for your assistance in treating our mutual patient

## 2022-08-26 ENCOUNTER — OFFICE VISIT (OUTPATIENT)
Dept: FAMILY MEDICINE CLINIC | Facility: CLINIC | Age: 27
End: 2022-08-26
Payer: COMMERCIAL

## 2022-08-26 VITALS
HEART RATE: 101 BPM | TEMPERATURE: 98 F | BODY MASS INDEX: 39.42 KG/M2 | OXYGEN SATURATION: 98 % | DIASTOLIC BLOOD PRESSURE: 80 MMHG | WEIGHT: 260.1 LBS | HEIGHT: 68 IN | SYSTOLIC BLOOD PRESSURE: 126 MMHG

## 2022-08-26 DIAGNOSIS — J06.9 VIRAL URI WITH COUGH: ICD-10-CM

## 2022-08-26 DIAGNOSIS — F41.1 GENERALIZED ANXIETY DISORDER WITH PANIC ATTACKS: Primary | ICD-10-CM

## 2022-08-26 DIAGNOSIS — J45.990 EXERCISE-INDUCED ASTHMA: ICD-10-CM

## 2022-08-26 DIAGNOSIS — F41.0 GENERALIZED ANXIETY DISORDER WITH PANIC ATTACKS: Primary | ICD-10-CM

## 2022-08-26 PROBLEM — B36.0 PITYRIASIS VERSICOLOR: Status: ACTIVE | Noted: 2019-09-09

## 2022-08-26 PROBLEM — Z00.00 ANNUAL PHYSICAL EXAM: Status: ACTIVE | Noted: 2022-08-26

## 2022-08-26 PROBLEM — K21.9 GASTROESOPHAGEAL REFLUX DISEASE: Status: ACTIVE | Noted: 2022-07-21

## 2022-08-26 PROBLEM — Z86.16 HISTORY OF SEVERE ACUTE RESPIRATORY SYNDROME CORONAVIRUS 2 (SARS-COV-2) DISEASE: Status: ACTIVE | Noted: 2022-07-21

## 2022-08-26 PROBLEM — E66.9 OBESITY, CLASS I, BMI 30-34.9: Status: ACTIVE | Noted: 2022-08-26

## 2022-08-26 PROBLEM — F41.9 ANXIETY: Status: ACTIVE | Noted: 2022-08-26

## 2022-08-26 PROCEDURE — 3725F SCREEN DEPRESSION PERFORMED: CPT | Performed by: FAMILY MEDICINE

## 2022-08-26 PROCEDURE — 99204 OFFICE O/P NEW MOD 45 MIN: CPT | Performed by: FAMILY MEDICINE

## 2022-08-26 RX ORDER — ALBUTEROL SULFATE 90 UG/1
2 AEROSOL, METERED RESPIRATORY (INHALATION) EVERY 6 HOURS PRN
Qty: 18 G | Refills: 3 | Status: SHIPPED | OUTPATIENT
Start: 2022-08-26 | End: 2022-10-25

## 2022-08-26 RX ORDER — ALBUTEROL SULFATE 90 UG/1
2 AEROSOL, METERED RESPIRATORY (INHALATION) EVERY 6 HOURS PRN
Qty: 18 G | Refills: 3 | Status: SHIPPED | OUTPATIENT
Start: 2022-08-26

## 2022-08-26 RX ORDER — MECLIZINE HCL 12.5 MG/1
12.5 TABLET ORAL DAILY
COMMUNITY
Start: 2022-08-18 | End: 2022-09-15

## 2022-08-26 RX ORDER — ALPRAZOLAM 0.25 MG/1
TABLET ORAL
COMMUNITY
Start: 2022-07-21 | End: 2022-08-26 | Stop reason: SDUPTHER

## 2022-08-26 RX ORDER — CITALOPRAM 20 MG/1
20 TABLET ORAL DAILY
Qty: 30 TABLET | Refills: 3 | Status: SHIPPED | OUTPATIENT
Start: 2022-08-26 | End: 2022-09-23

## 2022-08-26 RX ORDER — ALPRAZOLAM 0.25 MG/1
0.25 TABLET ORAL 3 TIMES DAILY PRN
Qty: 30 TABLET | Refills: 1 | Status: SHIPPED | OUTPATIENT
Start: 2022-08-26 | End: 2022-09-15 | Stop reason: SDUPTHER

## 2022-08-26 NOTE — PATIENT INSTRUCTIONS
Weight Management   AMBULATORY CARE:   Why it is important to manage your weight:  Being overweight increases your risk of health conditions such as heart disease, high blood pressure, type 2 diabetes, and certain types of cancer  It can also increase your risk for osteoarthritis, sleep apnea, and other respiratory problems  Aim for a slow, steady weight loss  Even a small amount of weight loss can lower your risk of health problems  Risks of being overweight:  Extra weight can cause many health problems, including the following:  · Diabetes (high blood sugar level)    · High blood pressure or high cholesterol    · Heart disease    · Stroke    · Gallbladder or liver disease    · Cancer of the colon, breast, prostate, liver, or kidney    · Sleep apnea    · Arthritis or gout    Screening  is done to check for health conditions before you have signs or symptoms  If you are 28to 79years old, your blood sugar level may be checked every 3 years for signs of prediabetes or diabetes  Your healthcare provider will check your blood pressure at each visit  High blood pressure can lead to a stroke or other problems  Your provider may check for signs of heart disease, cancer, or other health problems  How to lose weight safely:  A safe and healthy way to lose weight is to eat fewer calories and get regular exercise  · You can lose up about 1 pound a week by decreasing the number of calories you eat by 500 calories each day  You can decrease calories by eating smaller portion sizes or by cutting out high-calorie foods  Read labels to find out how many calories are in the foods you eat  · You can also burn calories with exercise such as walking, swimming, or biking  You will be more likely to keep weight off if you make these changes part of your lifestyle  Exercise at least 30 minutes per day on most days of the week   You can also fit in more physical activity by taking the stairs instead of the elevator or parking farther away from stores  Ask your healthcare provider about the best exercise plan for you  Healthy meal plan for weight management:  A healthy meal plan includes a variety of foods, contains fewer calories, and helps you stay healthy  A healthy meal plan includes the following:     · Eat whole-grain foods more often  A healthy meal plan should contain fiber  Fiber is the part of grains, fruits, and vegetables that is not broken down by your body  Whole-grain foods are healthy and provide extra fiber in your diet  Some examples of whole-grain foods are whole-wheat breads and pastas, oatmeal, brown rice, and bulgur  · Eat a variety of vegetables every day  Include dark, leafy greens such as spinach, kale, emilia greens, and mustard greens  Eat yellow and orange vegetables such as carrots, sweet potatoes, and winter squash  · Eat a variety of fruits every day  Choose fresh or canned fruit (canned in its own juice or light syrup) instead of juice  Fruit juice has very little or no fiber  · Eat low-fat dairy foods  Drink fat-free (skim) milk or 1% milk  Eat fat-free yogurt and low-fat cottage cheese  Try low-fat cheeses such as mozzarella and other reduced-fat cheeses  · Choose meat and other protein foods that are low in fat  Choose beans or other legumes such as split peas or lentils  Choose fish, skinless poultry (chicken or turkey), or lean cuts of red meat (beef or pork)  Before you cook meat or poultry, cut off any visible fat  · Use less fat and oil  Try baking foods instead of frying them  Add less fat, such as margarine, sour cream, regular salad dressing and mayonnaise to foods  Eat fewer high-fat foods  Some examples of high-fat foods include french fries, doughnuts, ice cream, and cakes  · Eat fewer sweets  Limit foods and drinks that are high in sugar  This includes candy, cookies, regular soda, and sweetened drinks  Ways to decrease calories:   · Eat smaller portions  ? Use a small plate with smaller servings  ? Do not eat second helpings  ? When you eat at a restaurant, ask for a box and place half of your meal in the box before you eat  ? Share an entrée with someone else  · Replace high-calorie snacks with healthy, low-calorie snacks  ? Choose fresh fruit, vegetables, fat-free rice cakes, or air-popped popcorn instead of potato chips, nuts, or chocolate  ? Choose water or calorie-free drinks instead of soda or sweetened drinks  · Do not shop for groceries when you are hungry  You may be more likely to make unhealthy food choices  Take a grocery list of healthy foods and shop after you have eaten  · Eat regular meals  Do not skip meals  Skipping meals can lead to overeating later in the day  This can make it harder for you to lose weight  Eat a healthy snack in place of a meal if you do not have time to eat a regular meal  Talk with a dietitian to help you create a meal plan and schedule that is right for you  Other things to consider as you try to lose weight:   · Be aware of situations that may give you the urge to overeat, such as eating while watching television  Find ways to avoid these situations  For example, read a book, go for a walk, or do crafts  · Meet with a weight loss support group or friends who are also trying to lose weight  This may help you stay motivated to continue working on your weight loss goals  © Copyright [employer] 2022 Information is for End User's use only and may not be sold, redistributed or otherwise used for commercial purposes  All illustrations and images included in CareNotes® are the copyrighted property of A D A M , Inc  or [facility] Mesha Bowman   The above information is an  only  It is not intended as medical advice for individual conditions or treatments  Talk to your doctor, nurse or pharmacist before following any medical regimen to see if it is safe and effective for you      Low Fat Diet   AMBULATORY CARE:   A low-fat diet  is an eating plan that is low in total fat, unhealthy fat, and cholesterol  You may need to follow a low-fat diet if you have trouble digesting or absorbing fat  You may also need to follow this diet if you have high cholesterol  You can also lower your cholesterol by increasing the amount of fiber in your diet  Soluble fiber is a type of fiber that helps to decrease cholesterol levels  Different types of fat in food:   · Limit unhealthy fats  A diet that is high in cholesterol, saturated fat, and trans fat may cause unhealthy cholesterol levels  Unhealthy cholesterol levels increase your risk of heart disease  ? Cholesterol:  Limit intake of cholesterol to less than 200 mg per day  Cholesterol is found in meat, eggs, and dairy  ? Saturated fat:  Limit saturated fat to less than 7% of your total daily calories  Ask your dietitian how many calories you need each day  Saturated fat is found in butter, cheese, ice cream, whole milk, and palm oil  Saturated fat is also found in meat, such as beef, pork, chicken skin, and processed meats  Processed meats include sausage, hot dogs, and bologna  ? Trans fat:  Avoid trans fat as much as possible  Trans fat is used in fried and baked foods  Foods that say trans fat free on the label may still have up to 0 5 grams of trans fat per serving  · Include healthy fats  Replace foods that are high in saturated and trans fat with foods high in healthy fats  This may help to decrease high cholesterol levels  ? Monounsaturated fats: These are found in avocados, nuts, and vegetable oils, such as olive, canola, and sunflower oil  ? Polyunsaturated fats: These can be found in vegetable oils, such as soybean or corn oil  Omega-3 fats can help to decrease the risk of heart disease  Omega-3 fats are found in fish, such as salmon, herring, trout, and tuna   Omega-3 fats can also be found in plant foods, such as walnuts, flaxseed, soybeans, and canola oil  Foods to limit or avoid:   · Grains:      ? Snacks that are made with partially hydrogenated oils, such as chips, regular crackers, and butter-flavored popcorn    ? High-fat baked goods, such as biscuits, croissants, doughnuts, pies, cookies, and pastries    · Dairy:      ? Whole milk, 2% milk, and yogurt and ice cream made with whole milk    ? Half and half creamer, heavy cream, and whipping cream    ? Cheese, cream cheese, and sour cream    · Meats and proteins:      ? High-fat cuts of meat (T-bone steak, regular hamburger, and ribs)    ? Fried meat, poultry (turkey and chicken), and fish    ? Poultry (chicken and turkey) with skin    ? Cold cuts (salami or bologna), hot dogs, dawson, and sausage    ? Whole eggs and egg yolks    · Vegetables and fruits with added fat:      ? Fried vegetables or vegetables in butter or high-fat sauces, such as cream or cheese sauces    ? Fried fruit or fruit served with butter or cream    · Fats:      ? Butter, stick margarine, and shortening    ? Coconut, palm oil, and palm kernel oil    Foods to include:   · Grains:      ? Whole-grain breads, cereals, pasta, and brown rice    ? Low-fat crackers and pretzels    · Vegetables and fruits:      ? Fresh, frozen, or canned vegetables (no salt or low-sodium)    ? Fresh, frozen, dried, or canned fruit (canned in light syrup or fruit juice)    ? Avocado    · Low-fat dairy products:      ? Nonfat (skim) or 1% milk    ? Nonfat or low-fat cheese, yogurt, and cottage cheese    · Meats and proteins:      ? Chicken or turkey with no skin    ? Baked or broiled fish    ? Lean beef and pork (loin, round, extra lean hamburger)    ? Beans and peas, unsalted nuts, soy products    ? Egg whites and substitutes    ? Seeds and nuts    · Fats:      ? Unsaturated oil, such as canola, olive, peanut, soybean, or sunflower oil    ? Soft or liquid margarine and vegetable oil spread    ?  Low-fat salad dressing    Other ways to decrease fat:   · Read food labels before you buy foods  Choose foods that have less than 30% of calories from fat  Choose low-fat or fat-free dairy products  Remember that fat free does not mean calorie free  These foods still contain calories, and too many calories can lead to weight gain  · Trim fat from meat and avoid fried food  Trim all visible fat from meat before you cook it  Remove the skin from poultry  Do not borrero meat, fish, or poultry  Bake, roast, boil, or broil these foods instead  Avoid fried foods  Eat a baked potato instead of Western Chel fries  Steam vegetables instead of sautéing them in butter  · Add less fat to foods  Use imitation dawson bits on salads and baked potatoes instead of regular dawson bits  Use fat-free or low-fat salad dressings instead of regular dressings  Use low-fat or nonfat butter-flavored topping instead of regular butter or margarine on popcorn and other foods  Ways to decrease fat in recipes:  Replace high-fat ingredients with low-fat or nonfat ones  This may cause baked goods to be drier than usual  You may need to use nonfat cooking spray on pans to prevent food from sticking  You also may need to change the amount of other ingredients, such as water, in the recipe  Try the following:  · Use low-fat or light margarine instead of regular margarine or shortening  · Use lean ground turkey breast or chicken, or lean ground beef (less than 5% fat) instead of hamburger  · Add 1 teaspoon of canola oil to 8 ounces of skim milk instead of using cream or half and half  · Use grated zucchini, carrots, or apples in breads instead of coconut  · Use blenderized, low-fat cottage cheese, plain tofu, or low-fat ricotta cheese instead of cream cheese  · Use 1 egg white and 1 teaspoon of canola oil, or use ¼ cup (2 ounces) of fat-free egg substitute instead of a whole egg       · Replace half of the oil that is called for in a recipe with applesauce when you bake  Use 3 tablespoons of cocoa powder and 1 tablespoon of canola oil instead of a square of baking chocolate  How to increase fiber:  Eat enough high-fiber foods to get 20 to 30 grams of fiber every day  Slowly increase your fiber intake to avoid stomach cramps, gas, and other problems  · Eat 3 ounces of whole-grain foods each day  An ounce is about 1 slice of bread  Eat whole-grain breads, such as whole-wheat bread  Whole wheat, whole-wheat flour, or other whole grains should be listed as the first ingredient on the food label  Replace white flour with whole-grain flour or use half of each in recipes  Whole-grain flour is heavier than white flour, so you may have to add more yeast or baking powder  · Eat a high-fiber cereal for breakfast   Oatmeal is a good source of soluble fiber  Look for cereals that have bran or fiber in the name  Choose whole-grain products, such as brown rice, barley, and whole-wheat pasta  · Eat more beans, peas, and lentils  For example, add beans to soups or salads  Eat at least 5 cups of fruits and vegetables each day  Eat fruits and vegetables with the peel because the peel is high in fiber  © Copyright [employer] 2022 Information is for End User's use only and may not be sold, redistributed or otherwise used for commercial purposes  All illustrations and images included in CareNotes® are the copyrighted property of [employer] M , Inc  or Sarah Bowman   The above information is an  only  It is not intended as medical advice for individual conditions or treatments  Talk to your doctor, nurse or pharmacist before following any medical regimen to see if it is safe and effective for you

## 2022-08-26 NOTE — PROGRESS NOTES
Assessment/Plan:       1  Generalized anxiety disorder with panic attacks  Assessment & Plan:  Start citalopram  Xanax prn  Mindfulness breathing/meditation  Yoga  exercise    Orders:  -     ALPRAZolam (XANAX) 0 25 mg tablet; Take 1 tablet (0 25 mg total) by mouth 3 (three) times a day as needed for anxiety  -     citalopram (CeleXA) 20 mg tablet; Take 1 tablet (20 mg total) by mouth daily    2  Viral URI with cough  -     albuterol (ProAir HFA) 90 mcg/act inhaler; Inhale 2 puffs every 6 (six) hours as needed for shortness of breath    3  Exercise-induced asthma  -     albuterol (Ventolin HFA) 90 mcg/act inhaler; Inhale 2 puffs every 6 (six) hours as needed for wheezing        Subjective:      Patient ID: Neris Marie is a 32 y o  female  Raybyron Pinto is here for her initial visit  She is very healthy and pleasant 77-year-old woman  Her main concern today stress and anxiety  She has been having episodes which present like vertigo with some mild palpitations and dizziness  She was seen and given meclizine which helps nausea  She also does get nauseous with these attacks  She went to a vestibular therapist was told it is not a crystal problem  It is most likely form of anxiety attack due to chronic anxiety  She is stressed out she has a lot of work responsibility and work related stress  She is also getting  in the near future  She has no active chest pain or shortness of breath  She has no prior history of treatment for anxiety  She is not depressed but does feel overwhelmed  She chronically has some exercise-induced asthma would like a refill on her inhaler  The following portions of the patient's history were reviewed and updated as appropriate: allergies, current medications, past family history, past medical history, past social history, past surgical history, and problem list     Review of Systems   Respiratory: Negative  Cardiovascular: Negative  Gastrointestinal: Negative  All other systems reviewed and are negative  Objective:      /80 (BP Location: Left arm, Patient Position: Sitting)   Pulse 101   Temp 98 °F (36 7 °C)   Ht 5' 8" (1 727 m)   Wt 118 kg (260 lb 1 6 oz)   SpO2 98%   BMI 39 55 kg/m²          Physical Exam  Vitals and nursing note reviewed  Constitutional:       Appearance: Normal appearance  HENT:      Head: Normocephalic and atraumatic  Nose: Nose normal       Mouth/Throat:      Mouth: Mucous membranes are moist    Eyes:      Extraocular Movements: Extraocular movements intact  Pupils: Pupils are equal, round, and reactive to light  Cardiovascular:      Rate and Rhythm: Normal rate and regular rhythm  Pulses: Normal pulses  Pulmonary:      Effort: Pulmonary effort is normal       Breath sounds: Normal breath sounds  Abdominal:      General: Bowel sounds are normal       Palpations: Abdomen is soft  Musculoskeletal:      Cervical back: Normal range of motion  Skin:     General: Skin is warm and dry  Capillary Refill: Capillary refill takes less than 2 seconds  Neurological:      General: No focal deficit present  Mental Status: She is alert  Psychiatric:         Mood and Affect: Mood normal          BMI Counseling: Body mass index is 39 55 kg/m²  The BMI is above normal  Nutrition recommendations include decreasing overall calorie intake

## 2022-08-30 NOTE — TELEPHONE ENCOUNTER
As a follow-up, a second attempt has been made for outreach via fax, please see Contacts section for details      Thank you  Evgeny Bennett MA

## 2022-09-08 NOTE — TELEPHONE ENCOUNTER
Upon review of the In Basket request we were able to locate, review, and update the patient chart as requested for Pap Smear (HPV) aka Cervical Cancer Screening  Any additional questions or concerns should be emailed to the Practice Liaisons via [email or web address] email, please do not reply via In Basket      Thank you  Michelle Dunbar MA

## 2022-09-15 ENCOUNTER — OFFICE VISIT (OUTPATIENT)
Dept: FAMILY MEDICINE CLINIC | Facility: CLINIC | Age: 27
End: 2022-09-15
Payer: COMMERCIAL

## 2022-09-15 VITALS
TEMPERATURE: 97.6 F | SYSTOLIC BLOOD PRESSURE: 130 MMHG | DIASTOLIC BLOOD PRESSURE: 88 MMHG | WEIGHT: 256 LBS | OXYGEN SATURATION: 97 % | HEART RATE: 87 BPM | HEIGHT: 68 IN | BODY MASS INDEX: 38.8 KG/M2

## 2022-09-15 DIAGNOSIS — F41.9 ANXIETY: Primary | ICD-10-CM

## 2022-09-15 DIAGNOSIS — Z11.4 SCREENING FOR HIV (HUMAN IMMUNODEFICIENCY VIRUS): ICD-10-CM

## 2022-09-15 DIAGNOSIS — Z11.59 NEED FOR HEPATITIS C SCREENING TEST: ICD-10-CM

## 2022-09-15 DIAGNOSIS — F41.0 GENERALIZED ANXIETY DISORDER WITH PANIC ATTACKS: ICD-10-CM

## 2022-09-15 DIAGNOSIS — F41.1 GENERALIZED ANXIETY DISORDER WITH PANIC ATTACKS: ICD-10-CM

## 2022-09-15 PROCEDURE — 99213 OFFICE O/P EST LOW 20 MIN: CPT | Performed by: FAMILY MEDICINE

## 2022-09-15 RX ORDER — ALPRAZOLAM 0.25 MG/1
0.25 TABLET ORAL 3 TIMES DAILY PRN
Qty: 60 TABLET | Refills: 1 | Status: SHIPPED | OUTPATIENT
Start: 2022-09-15

## 2022-09-15 NOTE — PROGRESS NOTES
Assessment/Plan:       1  Anxiety  Assessment & Plan:  Continue citalopram and xanax      2  Need for hepatitis C screening test  -     Hepatitis C Antibody ([facility]); Future    3  Screening for HIV (human immunodeficiency virus)  -     HIV 1/2 Antigen/Antibody (4th Generation) w Reflex [facility]; Future        Subjective:      Patient ID: Cindi Jackson is a 32 y o  female  Yulissa Hackett is here for a f/u on suspected anxiety  She is doing phenomenally on the xanax/citalopram   Only 1 attack since starting the meds because she didn't take her xanax that day  Her GI sx are a lot better as well  No adverse side effects  The following portions of the patient's history were reviewed and updated as appropriate: allergies, current medications, past family history, past medical history, past social history, past surgical history, and problem list     Review of Systems   Respiratory: Negative  Cardiovascular: Negative  Objective:      /88 (BP Location: Left arm, Patient Position: Sitting, Cuff Size: Large)   Pulse 87   Temp 97 6 °F (36 4 °C)   Ht 5' 8" (1 727 m)   Wt 116 kg (256 lb)   SpO2 97%   BMI 38 92 kg/m²          Physical Exam  Vitals and nursing note reviewed  Constitutional:       Appearance: Normal appearance  HENT:      Head: Normocephalic and atraumatic  Nose: Nose normal       Mouth/Throat:      Mouth: Mucous membranes are moist    Eyes:      Extraocular Movements: Extraocular movements intact  Pupils: Pupils are equal, round, and reactive to light  Cardiovascular:      Rate and Rhythm: Normal rate and regular rhythm  Pulses: Normal pulses  Pulmonary:      Effort: Pulmonary effort is normal       Breath sounds: Normal breath sounds  Abdominal:      General: Bowel sounds are normal       Palpations: Abdomen is soft  Musculoskeletal:      Cervical back: Normal range of motion  Skin:     General: Skin is warm and dry        Capillary Refill: Capillary refill takes less than 2 seconds  Neurological:      General: No focal deficit present  Mental Status: She is alert     Psychiatric:         Mood and Affect: Mood normal

## 2022-09-23 ENCOUNTER — TELEPHONE (OUTPATIENT)
Dept: FAMILY MEDICINE CLINIC | Facility: CLINIC | Age: 27
End: 2022-09-23

## 2022-09-23 DIAGNOSIS — F41.9 ANXIETY: Primary | ICD-10-CM

## 2022-09-23 RX ORDER — CITALOPRAM 40 MG/1
40 TABLET ORAL DAILY
Qty: 90 TABLET | Refills: 3 | Status: SHIPPED | OUTPATIENT
Start: 2022-09-23

## 2022-09-23 NOTE — TELEPHONE ENCOUNTER
Patient called was seen 9/15 smptoms are continuing , have not gotten any better, patient is asking what can be done, dose she need to be seen again

## 2022-09-23 NOTE — TELEPHONE ENCOUNTER
I spoke with the patient her anxiety is worse  She will increase her citalopram from 20 to 40 mg and increase her Xanax to 3 times a day for now    I sent in a new prescription for citalopram

## 2022-10-06 ENCOUNTER — TELEMEDICINE (OUTPATIENT)
Dept: FAMILY MEDICINE CLINIC | Facility: CLINIC | Age: 27
End: 2022-10-06
Payer: COMMERCIAL

## 2022-10-06 VITALS — HEIGHT: 68 IN | WEIGHT: 256 LBS | BODY MASS INDEX: 38.8 KG/M2

## 2022-10-06 DIAGNOSIS — J01.90 ACUTE BACTERIAL SINUSITIS: Primary | ICD-10-CM

## 2022-10-06 DIAGNOSIS — B96.89 ACUTE BACTERIAL SINUSITIS: Primary | ICD-10-CM

## 2022-10-06 PROCEDURE — 99213 OFFICE O/P EST LOW 20 MIN: CPT | Performed by: FAMILY MEDICINE

## 2022-10-06 RX ORDER — AMOXICILLIN AND CLAVULANATE POTASSIUM 875; 125 MG/1; MG/1
1 TABLET, FILM COATED ORAL EVERY 12 HOURS SCHEDULED
Qty: 20 TABLET | Refills: 0 | Status: SHIPPED | OUTPATIENT
Start: 2022-10-06 | End: 2022-10-16

## 2022-10-06 NOTE — PROGRESS NOTES
Virtual Regular Visit    Verification of patient location:    Patient is located in the following state in which I hold an active license [place]      Assessment/Plan:    Problem List Items Addressed This Visit    None     Visit Diagnoses     Acute bacterial sinusitis    -  Primary    start augmentin               Reason for visit is No chief complaint on file  Encounter provider Kateryna Lopes MD    [facility] [address] [facility] [address] [facility] [address] [facility] [id number]      Recent Visits  No visits were found meeting these conditions  Showing recent visits within past 7 days and meeting all other requirements  Today's Visits  Date Type Provider Dept   10/06/22 Telemedicine Kateryna Lopes MD [facility]   Showing today's visits and meeting all other requirements  Future Appointments  No visits were found meeting these conditions  Showing future appointments within next 150 days and meeting all other requirements       The patient was identified by name and date of birth  Lauro Watt was informed that this is a telemedicine visit and that the visit is being conducted through [address] [place] [address] [facility] [employer] and patient was informed that this is a secure, HIPAA-compliant platform  She agrees to proceed     My office door was closed  No one else was in the room  She acknowledged consent and understanding of privacy and security of the video platform  The patient has agreed to participate and understands they can discontinue the visit at any time  Patient is aware this is a billable service  Subjective  Lauro Watt is a 32 y o  female    The patient has been having bad allergies  She now has a lot of right facial  Pain and pressure  She has a pounding right headache  No fever  No sore throat  Her teeth do not hurt  Her headache is the worst sx  She is covid vaccinated  She has a hx of sinus infections    They are infrequent since starting routine flonase  Her anxiety is well controlled on her current meds  This all started 2-3 days ago  She failed otc meds  Past Medical History:   Diagnosis Date    Endometriosis     Peptic ulcer        Past Surgical History:   Procedure Laterality Date    ADENOIDECTOMY      APPENDECTOMY      TONSILLECTOMY         Current Outpatient Medications   Medication Sig Dispense Refill    albuterol (ProAir HFA) 90 mcg/act inhaler Inhale 2 puffs every 6 (six) hours as needed for shortness of breath 18 g 3    albuterol (Ventolin HFA) 90 mcg/act inhaler Inhale 2 puffs every 6 (six) hours as needed for wheezing 18 g 3    ALPRAZolam (XANAX) 0 25 mg tablet Take 1 tablet (0 25 mg total) by mouth 3 (three) times a day as needed for anxiety 60 tablet 1    citalopram (CeleXA) 40 mg tablet Take 1 tablet (40 mg total) by mouth daily 90 tablet 3    cyclobenzaprine (FLEXERIL) 10 mg tablet Take 10 mg by mouth      fluticasone (FLONASE) 50 mcg/act nasal spray       norethindrone-ethinyl estradiol (JUNEL FE 1/20) 1-20 MG-MCG per tablet Take 1 tablet by mouth daily       No current facility-administered medications for this visit  Allergies   Allergen Reactions    Sulfa Antibiotics Hives       Review of Systems   HENT: Positive for sinus pressure and sinus pain  Respiratory: Positive for cough  Video Exam    There were no vitals filed for this visit      Physical Exam     I spent 8 minutes directly with the patient during this visit

## 2022-10-17 ENCOUNTER — OFFICE VISIT (OUTPATIENT)
Dept: FAMILY MEDICINE CLINIC | Facility: CLINIC | Age: 27
End: 2022-10-17
Payer: COMMERCIAL

## 2022-10-17 VITALS
BODY MASS INDEX: 39.01 KG/M2 | SYSTOLIC BLOOD PRESSURE: 138 MMHG | HEIGHT: 68 IN | DIASTOLIC BLOOD PRESSURE: 90 MMHG | HEART RATE: 72 BPM | WEIGHT: 257.4 LBS | TEMPERATURE: 97.9 F | OXYGEN SATURATION: 97 %

## 2022-10-17 DIAGNOSIS — G93.31 POSTVIRAL SYNDROME: Primary | ICD-10-CM

## 2022-10-17 PROBLEM — Z86.16 HISTORY OF SEVERE ACUTE RESPIRATORY SYNDROME CORONAVIRUS 2 (SARS-COV-2) DISEASE: Status: RESOLVED | Noted: 2022-07-21 | Resolved: 2022-10-17

## 2022-10-17 PROCEDURE — 99213 OFFICE O/P EST LOW 20 MIN: CPT | Performed by: INTERNAL MEDICINE

## 2022-10-17 RX ORDER — PREDNISONE 20 MG/1
40 TABLET ORAL DAILY
Qty: 10 TABLET | Refills: 0 | Status: SHIPPED | OUTPATIENT
Start: 2022-10-17 | End: 2022-10-22

## 2022-10-17 RX ORDER — NORETHINDRONE ACETATE AND ETHINYL ESTRADIOL AND FERROUS FUMARATE 1.5-30(21)
KIT ORAL
COMMUNITY
Start: 2022-10-08

## 2022-10-17 NOTE — ASSESSMENT & PLAN NOTE
She is already completed a course of antibiotics and really did not have any improvement in her symptoms  I do not think that she has pneumonia  She may continue to use the Flonase and Afrin (she should stop Afrin after 5 days)  I am going to give her a course of prednisone to see if that does not improve her symptoms

## 2022-10-17 NOTE — PROGRESS NOTES
Assessment/Plan:    Postviral syndrome  She is already completed a course of antibiotics and really did not have any improvement in her symptoms  I do not think that she has pneumonia  She may continue to use the Flonase and Afrin (she should stop Afrin after 5 days)  I am going to give her a course of prednisone to see if that does not improve her symptoms  Chief Complaint     chest cold ; Nasal Congestion; Fatigue          Patient ID: Sage Ariza is a 32 y o  female who returns for evaluation of URI  She is extremely tired and slept 14 hours yesterday  She has "disgusting thick mucus" that is coming out of her nose and she is coughing it up  Her ears feel full  She took a 10 day course of Augmentin which she finished yesterday  She is taking vitamin C, Flonase, Afrin, Vicks  She retested negative for COVID 2 days ago  Objective:    /90 (BP Location: Right arm, Patient Position: Sitting, Cuff Size: Large)   Pulse 72   Temp 97 9 °F (36 6 °C)   Ht 5' 8" (1 727 m)   Wt 117 kg (257 lb 6 4 oz)   SpO2 97%   BMI 39 14 kg/m²     Wt Readings from Last 3 Encounters:   10/17/22 117 kg (257 lb 6 4 oz)   10/06/22 116 kg (256 lb)   09/15/22 116 kg (256 lb)         Physical Exam  Constitutional:       General: She is not in acute distress  Appearance: Normal appearance  She is not ill-appearing  HENT:      Nose: Congestion present  Mouth/Throat:      Mouth: Mucous membranes are moist       Pharynx: Oropharynx is clear  Cardiovascular:      Rate and Rhythm: Normal rate and regular rhythm  Heart sounds: Normal heart sounds  Pulmonary:      Effort: Pulmonary effort is normal       Breath sounds: Normal breath sounds  Lymphadenopathy:      Cervical: No cervical adenopathy  Neurological:      Mental Status: She is alert

## 2022-10-25 ENCOUNTER — OFFICE VISIT (OUTPATIENT)
Dept: FAMILY MEDICINE CLINIC | Facility: CLINIC | Age: 27
End: 2022-10-25
Payer: COMMERCIAL

## 2022-10-25 VITALS
HEIGHT: 68 IN | BODY MASS INDEX: 38.89 KG/M2 | WEIGHT: 256.6 LBS | TEMPERATURE: 97.4 F | DIASTOLIC BLOOD PRESSURE: 88 MMHG | SYSTOLIC BLOOD PRESSURE: 130 MMHG | OXYGEN SATURATION: 97 % | HEART RATE: 77 BPM

## 2022-10-25 DIAGNOSIS — R42 VERTIGO: Primary | ICD-10-CM

## 2022-10-25 PROBLEM — G93.31 POSTVIRAL SYNDROME: Status: RESOLVED | Noted: 2022-10-17 | Resolved: 2022-10-25

## 2022-10-25 PROCEDURE — 99213 OFFICE O/P EST LOW 20 MIN: CPT | Performed by: INTERNAL MEDICINE

## 2022-10-25 NOTE — PROGRESS NOTES
Assessment/Plan:    Vertigo  Her symptoms are definitely consistent with vertigo had an actually the triad of ear fullness, hearing loss, and vertigo are reminiscent of Meniere's disease  True that the symptoms are somewhat shorter lives  I am going to send her to ENT for evaluation of possible brainstem evoked potentials  She does not want to take the meclizine does it made her groggy  I would continue the citalopram for now  Chief Complaint     Anxiety          Patient ID: Nette Chi is a 32 y o  female who returns for evaluation of recurrent episodes of right ear fullness and room spinning  Her symptoms began about three months ago which time she was under a lot of stress because she was preparing for her wedding, there was some legal problems in the family, and her father was diagnosed with a sarcoma of the right chest wall  She had seen Dr Claudene Marines in August and he felt that most of her symptoms are actually from anxiety  He started on citalopram alprazolam   There was some initial improvement but she seemed to plateau in the citalopram was increased to 40 mg daily  Her stressors have pretty much resolved at this point and she continues to have episodes where she will notice some fullness in her right ear which is then followed by the sensation of the room spinning  This lasts a few minutes  Thereafter the hearing in her right ear is muffled and she also hears a high-pitched tinnitus in her ear (like the sound the TV used to make in the past when the programming went off)        Objective:    /88 (BP Location: Left arm, Patient Position: Sitting, Cuff Size: Large)   Pulse 77   Temp (!) 97 4 °F (36 3 °C)   Ht 5' 8" (1 727 m)   Wt 116 kg (256 lb 9 6 oz)   SpO2 97%   BMI 39 02 kg/m²     Wt Readings from Last 3 Encounters:   10/25/22 116 kg (256 lb 9 6 oz)   10/17/22 117 kg (257 lb 6 4 oz)   10/06/22 116 kg (256 lb)         Physical Exam  Constitutional:       General: She is not in acute distress  Appearance: Normal appearance  She is not ill-appearing  Eyes:      Extraocular Movements: Extraocular movements intact  Pupils: Pupils are equal, round, and reactive to light  Comments: No spontaneous or inducible nystagmus  Neurological:      Mental Status: She is alert  Comments: Cranial nerves 2-12 intact, cerebellar was intact finger-to-nose

## 2022-10-25 NOTE — ASSESSMENT & PLAN NOTE
Her symptoms are definitely consistent with vertigo had an actually the triad of ear fullness, hearing loss, and vertigo are reminiscent of Meniere's disease  True that the symptoms are somewhat shorter lives  I am going to send her to ENT for evaluation of possible brainstem evoked potentials  She does not want to take the meclizine does it made her groggy  I would continue the citalopram for now

## 2022-11-04 ENCOUNTER — TELEPHONE (OUTPATIENT)
Dept: FAMILY MEDICINE CLINIC | Facility: CLINIC | Age: 27
End: 2022-11-04

## 2022-11-04 DIAGNOSIS — J01.90 ACUTE BACTERIAL SINUSITIS: Primary | ICD-10-CM

## 2022-11-04 DIAGNOSIS — B96.89 ACUTE BACTERIAL SINUSITIS: Primary | ICD-10-CM

## 2022-11-04 RX ORDER — PREDNISONE 10 MG/1
10 TABLET ORAL 2 TIMES DAILY WITH MEALS
Qty: 10 TABLET | Refills: 0 | Status: SHIPPED | OUTPATIENT
Start: 2022-11-04 | End: 2022-11-09

## 2022-11-04 NOTE — TELEPHONE ENCOUNTER
Patient called this morning stating her symptoms are worsening  She states the steroids made her feel better but once they were done everything came back  She has a cough, nasal congestion, green/yellow mucus  She denies any fever or body chills  I had asked if she had a chest xray, she said no because at her last appointment, the doctor listened to her lungs and noted they were clear  Patient believes a longer course of prednisone would wipe that out  Can you send something to [facility]

## 2022-11-07 ENCOUNTER — TELEPHONE (OUTPATIENT)
Dept: FAMILY MEDICINE CLINIC | Facility: CLINIC | Age: 27
End: 2022-11-07

## 2022-11-07 NOTE — TELEPHONE ENCOUNTER
Since she has already received a course of Augmentin (and that didn't seem to improve her symptoms), would not pursue more antibiotics  I would recommend Claritin D in an effort to continue to clear secretions and Flonase

## 2022-11-07 NOTE — TELEPHONE ENCOUNTER
Patient called with concerns  States her steroids are ending tomorrow  Patient is still blowing and coughing green/yellow mucus  The cough is better  She states if you would like to see her she will come in  The steroid has helped she is just worried with it ending tomorrow and she is not 100% better if she should be concerned

## 2022-12-15 ENCOUNTER — OFFICE VISIT (OUTPATIENT)
Dept: FAMILY MEDICINE CLINIC | Facility: CLINIC | Age: 27
End: 2022-12-15

## 2022-12-15 VITALS
DIASTOLIC BLOOD PRESSURE: 79 MMHG | TEMPERATURE: 98.2 F | OXYGEN SATURATION: 97 % | HEART RATE: 94 BPM | HEIGHT: 68 IN | SYSTOLIC BLOOD PRESSURE: 140 MMHG | WEIGHT: 267.2 LBS | BODY MASS INDEX: 40.5 KG/M2

## 2022-12-15 DIAGNOSIS — F41.0 GENERALIZED ANXIETY DISORDER WITH PANIC ATTACKS: Primary | ICD-10-CM

## 2022-12-15 DIAGNOSIS — F41.1 GENERALIZED ANXIETY DISORDER WITH PANIC ATTACKS: Primary | ICD-10-CM

## 2022-12-15 NOTE — PROGRESS NOTES
Assessment/Plan:       1  Generalized anxiety disorder with panic attacks  Assessment & Plan:  Discussed block breathing ([id number])  Continue citalopram  Continue prn xanax          Subjective:      Patient ID: Kelvin Mendez is a 32 y o  female  Riley Randbs is here to f/u on anxiety  She got through the wedding October 8th  Around the 15th she stopped the xanax and a week or 2 later had reoccurance of sx  She was dizzy with right ear fullness/muffling/room spinning  ENT feels it is not consistent with Meniere's  She does also have tinnitus  This is unpredictable  Last night 3 full blown episodes  She gets nausea  The following portions of the patient's history were reviewed and updated as appropriate: allergies, current medications, past family history, past medical history, past social history, past surgical history, and problem list     Review of Systems   Respiratory: Negative  Cardiovascular: Negative  Gastrointestinal: Negative  Objective:      /79 (BP Location: Left arm, Patient Position: Sitting)   Pulse 94   Temp 98 2 °F (36 8 °C)   Ht 5' 8" (1 727 m)   Wt 121 kg (267 lb 3 2 oz)   SpO2 97%   BMI 40 63 kg/m²          Physical Exam  Vitals and nursing note reviewed  Constitutional:       Appearance: Normal appearance  HENT:      Head: Normocephalic and atraumatic  Nose: Nose normal       Mouth/Throat:      Mouth: Mucous membranes are moist    Eyes:      Extraocular Movements: Extraocular movements intact  Pupils: Pupils are equal, round, and reactive to light  Cardiovascular:      Rate and Rhythm: Normal rate and regular rhythm  Pulses: Normal pulses  Pulmonary:      Effort: Pulmonary effort is normal       Breath sounds: Normal breath sounds  Abdominal:      General: Bowel sounds are normal       Palpations: Abdomen is soft  Musculoskeletal:      Cervical back: Normal range of motion  Skin:     General: Skin is warm and dry  Capillary Refill: Capillary refill takes less than 2 seconds  Neurological:      General: No focal deficit present  Mental Status: She is alert     Psychiatric:         Mood and Affect: Mood normal

## 2022-12-15 NOTE — ASSESSMENT & PLAN NOTE
Discussed block breathing ([id number])  Continue citalopram  Continue prn xanax  Recommended [employer] abhijit (meditation)  Unwinding Anxiety abhijit(Douglas Greene)

## 2022-12-16 ENCOUNTER — RA CDI HCC (OUTPATIENT)
Dept: OTHER | Facility: HOSPITAL | Age: 27
End: 2022-12-16

## 2022-12-16 NOTE — PROGRESS NOTES
Please review if this dx  is applicable to the patient's condition and assess and document, if applicable in next visit     [facility] 75  coding opportunities          Chart Reviewed number of suggestions sent to Provider: 1     Patients Insurance        Commercial Insurance: [address] [place] [address]

## 2022-12-28 ENCOUNTER — OFFICE VISIT (OUTPATIENT)
Dept: FAMILY MEDICINE CLINIC | Facility: CLINIC | Age: 27
End: 2022-12-28

## 2022-12-28 VITALS
SYSTOLIC BLOOD PRESSURE: 128 MMHG | HEIGHT: 68 IN | OXYGEN SATURATION: 98 % | BODY MASS INDEX: 40.47 KG/M2 | DIASTOLIC BLOOD PRESSURE: 84 MMHG | HEART RATE: 88 BPM | WEIGHT: 267 LBS | TEMPERATURE: 97.8 F

## 2022-12-28 DIAGNOSIS — R42 DIZZINESS: ICD-10-CM

## 2022-12-28 DIAGNOSIS — F41.1 GENERALIZED ANXIETY DISORDER WITH PANIC ATTACKS: Primary | ICD-10-CM

## 2022-12-28 DIAGNOSIS — F41.0 GENERALIZED ANXIETY DISORDER WITH PANIC ATTACKS: Primary | ICD-10-CM

## 2022-12-28 NOTE — PROGRESS NOTES
Assessment/Plan:       1  Generalized anxiety disorder with panic attacks    2  Dizziness  Comments:  check an mri  Orders:  -     MRI brain w wo contrast; Future; Expected date: 12/28/2022          Subjective:      Patient ID: Juan R Nunez is a 32 y o  female  Nga Eubanks was doing well on the routine xanax but had 7 episodes the day after xmas  There was a lot of dizziness as well as ear fullness  She has been using breathing techniques  The room was spinning  The following portions of the patient's history were reviewed and updated as appropriate: allergies, current medications, past family history, past medical history, past social history, past surgical history, and problem list     Review of Systems   Respiratory: Negative  Cardiovascular: Negative  Gastrointestinal: Negative  Objective:      /84 (BP Location: Left arm, Patient Position: Sitting, Cuff Size: Standard)   Pulse 88   Temp 97 8 °F (36 6 °C)   Ht 5' 8" (1 727 m)   Wt 121 kg (267 lb)   SpO2 98%   BMI 40 60 kg/m²          Physical Exam  Vitals and nursing note reviewed  Constitutional:       Appearance: Normal appearance  HENT:      Head: Normocephalic and atraumatic  Nose: Nose normal       Mouth/Throat:      Mouth: Mucous membranes are moist    Eyes:      Extraocular Movements: Extraocular movements intact  Pupils: Pupils are equal, round, and reactive to light  Cardiovascular:      Rate and Rhythm: Normal rate and regular rhythm  Pulses: Normal pulses  Pulmonary:      Effort: Pulmonary effort is normal       Breath sounds: Normal breath sounds  Abdominal:      General: Bowel sounds are normal       Palpations: Abdomen is soft  Musculoskeletal:      Cervical back: Normal range of motion  Skin:     General: Skin is warm and dry  Capillary Refill: Capillary refill takes less than 2 seconds  Neurological:      General: No focal deficit present  Mental Status: She is alert  Psychiatric:         Mood and Affect: Mood normal

## 2022-12-30 ENCOUNTER — RA CDI HCC (OUTPATIENT)
Dept: OTHER | Facility: HOSPITAL | Age: 27
End: 2022-12-30

## 2022-12-30 NOTE — PROGRESS NOTES
[id number]        [facility] 75  coding opportunities          Chart Reviewed number of suggestions sent to Provider: 2     Patients Insurance        Commercial Insurance: [employer]

## 2023-01-09 ENCOUNTER — TELEPHONE (OUTPATIENT)
Dept: FAMILY MEDICINE CLINIC | Facility: CLINIC | Age: 28
End: 2023-01-09

## 2023-01-09 NOTE — TELEPHONE ENCOUNTER
Patient is asking if you will be completing her the peer to peer regarding her denial on the MRI    She said to tell you that she had seven episodes

## 2023-01-11 ENCOUNTER — TELEPHONE (OUTPATIENT)
Dept: FAMILY MEDICINE CLINIC | Facility: CLINIC | Age: 28
End: 2023-01-11

## 2023-01-11 ENCOUNTER — OFFICE VISIT (OUTPATIENT)
Dept: FAMILY MEDICINE CLINIC | Facility: CLINIC | Age: 28
End: 2023-01-11

## 2023-01-11 VITALS
OXYGEN SATURATION: 97 % | DIASTOLIC BLOOD PRESSURE: 84 MMHG | BODY MASS INDEX: 40.77 KG/M2 | SYSTOLIC BLOOD PRESSURE: 144 MMHG | HEART RATE: 89 BPM | HEIGHT: 68 IN | WEIGHT: 269 LBS

## 2023-01-11 DIAGNOSIS — R42 VERTIGO: Primary | ICD-10-CM

## 2023-01-11 RX ORDER — MECLIZINE HYDROCHLORIDE 25 MG/1
25 TABLET ORAL EVERY 8 HOURS PRN
Status: SHIPPED | OUTPATIENT
Start: 2023-01-11

## 2023-01-11 RX ORDER — PREDNISONE 10 MG/1
TABLET ORAL
Qty: 30 TABLET | Refills: 0 | Status: SHIPPED | OUTPATIENT
Start: 2023-01-11 | End: 2023-01-23

## 2023-01-11 NOTE — PROGRESS NOTES
Assessment/Plan:       1  Vertigo  Comments:  restart meclizine  trial prednisone  ref to ent  check mri  Orders:  -     Ambulatory Referral to Otolaryngology; Future  -     meclizine (ANTIVERT) tablet 25 mg  -     predniSONE 10 mg tablet; Take 4 tablets (40 mg total) by mouth daily for 3 days, THEN 3 tablets (30 mg total) daily for 3 days, THEN 2 tablets (20 mg total) daily for 3 days, THEN 1 tablet (10 mg total) daily for 3 days  Subjective:      Patient ID: Gordon Aquino is a 32 y o  female  Marnie Carlos is here for an acute visit  She has had 11 episodes of vertigo in the past few days she had 1 this morning while driving  She is experiencing right ear hearing loss with an episode that is about 5 minutes  She is dizzy she has fullness and tinnitus and a lot of extreme fatigue without palpitation  She has seen otolaryngology for this and was told it was probably not Ménière's due to the short duration of her symptoms  Meclizine helped with nausea in the past   I had a ordered an MRI of her brain but it was not covered yet working to try and get that covered unguinal refer her for another ear nose and throat evaluation put her back on meclizine and a burst of prednisone  The following portions of the patient's history were reviewed and updated as appropriate: allergies, current medications, past family history, past medical history, past social history, past surgical history, and problem list     Review of Systems   Respiratory: Negative  Cardiovascular: Negative  Objective:      /84 (BP Location: Left arm, Patient Position: Sitting, Cuff Size: Large)   Pulse 89   Ht 5' 8" (1 727 m)   Wt 122 kg (269 lb)   SpO2 97%   BMI 40 90 kg/m²          Physical Exam  Vitals and nursing note reviewed  Constitutional:       Appearance: Normal appearance  HENT:      Head: Normocephalic and atraumatic        Nose: Nose normal       Mouth/Throat:      Mouth: Mucous membranes are moist    Eyes:      Extraocular Movements: Extraocular movements intact  Pupils: Pupils are equal, round, and reactive to light  Cardiovascular:      Rate and Rhythm: Normal rate and regular rhythm  Pulses: Normal pulses  Pulmonary:      Effort: Pulmonary effort is normal       Breath sounds: Normal breath sounds  Abdominal:      General: Bowel sounds are normal       Palpations: Abdomen is soft  Musculoskeletal:      Cervical back: Normal range of motion  Skin:     General: Skin is warm and dry  Capillary Refill: Capillary refill takes less than 2 seconds  Neurological:      General: No focal deficit present  Mental Status: She is alert        Comments: Lateral right sided nystagmus   Psychiatric:         Mood and Affect: Mood normal

## 2023-01-11 NOTE — TELEPHONE ENCOUNTER
Patient called requesting to be seen ASAP  Stating she just had another episode of vertigo while she was driving  She did pull off to the side of the road  States it lasted about 6 minutes  The vertigo episodes are happening more frequently now  She had 8 on Sunday, 5 Monday, 5 Tuesday  The vertigo episodes are followed by loss of hearing in her right ear and extreme fatigue "they take a lot of of me"  I advised her you are not in the office yet and she should report to the ED for evaluation  She declined  I also offered her an appt today at 9:45am, she declined this as well stating the episode has passed now so there is nothing to see  I advised her I will discuss this with Dr David Martinez and will call her back once I have directions  She is also asking the status of the appeal for her MRI

## 2023-01-12 ENCOUNTER — PATIENT MESSAGE (OUTPATIENT)
Dept: FAMILY MEDICINE CLINIC | Facility: CLINIC | Age: 28
End: 2023-01-12

## 2023-01-13 NOTE — TELEPHONE ENCOUNTER
Pt called back on today 01/13/23 to find out the status regarding the peer to peer on  the MRI denial

## 2023-02-03 DIAGNOSIS — R42 DIZZINESS: Primary | ICD-10-CM

## 2023-02-06 ENCOUNTER — TELEPHONE (OUTPATIENT)
Dept: FAMILY MEDICINE CLINIC | Facility: CLINIC | Age: 28
End: 2023-02-06

## 2023-02-06 DIAGNOSIS — R42 DIZZINESS: Primary | ICD-10-CM

## 2023-02-06 NOTE — TELEPHONE ENCOUNTER
Patient aware CT scan was denied  She will follow up with Neuro per Dr Blair Client       Numbers given for [facility] and [facility]

## 2023-02-08 ENCOUNTER — TELEPHONE (OUTPATIENT)
Dept: FAMILY MEDICINE CLINIC | Facility: CLINIC | Age: 28
End: 2023-02-08

## 2023-02-08 NOTE — TELEPHONE ENCOUNTER
Patient called regarding her CT and MRI denials stating she spoke to her Insurance Rep with her employer who states "all you need to do is submit clinical notes to get the tests approved"  I placed call to Ryan Paz at [phone] who stated she is the [employer] with her employer  She stated she made phone calls to [facility] and they informed her that they have been trying to contact our office but has been unsuccessful  I informed her that we did submit clinical notes and I would reach out to my prior auth team for further direction  Message sent to Rahul Dickerson to see if she can assist me with this matter

## 2023-02-08 NOTE — TELEPHONE ENCOUNTER
I placed call to [facility] @ [phone]  Call reference #[id number]  Dr Marijo Halsted did a peer to peer with [facility] and is reordering an MRI with new diagnosis and is addending his office notes to support diagnosis

## 2023-02-10 ENCOUNTER — HOSPITAL ENCOUNTER (OUTPATIENT)
Dept: MRI IMAGING | Facility: HOSPITAL | Age: 28
Discharge: HOME | End: 2023-02-10

## 2023-02-10 ENCOUNTER — TELEPHONE (OUTPATIENT)
Dept: FAMILY MEDICINE CLINIC | Facility: CLINIC | Age: 28
End: 2023-02-10

## 2023-02-10 DIAGNOSIS — R51.9 CHRONIC NONINTRACTABLE HEADACHE, UNSPECIFIED HEADACHE TYPE: ICD-10-CM

## 2023-02-10 DIAGNOSIS — R51.9 NONINTRACTABLE HEADACHE, UNSPECIFIED CHRONICITY PATTERN, UNSPECIFIED HEADACHE TYPE: ICD-10-CM

## 2023-02-10 DIAGNOSIS — R42 DIZZINESS: Primary | ICD-10-CM

## 2023-02-10 DIAGNOSIS — R42 DIZZINESS: ICD-10-CM

## 2023-02-10 DIAGNOSIS — G89.29 CHRONIC NONINTRACTABLE HEADACHE, UNSPECIFIED HEADACHE TYPE: ICD-10-CM

## 2023-02-10 RX ADMIN — GADOBUTROL 12 ML: 604.72 INJECTION INTRAVENOUS at 13:15

## 2023-02-10 NOTE — TELEPHONE ENCOUNTER
Received call from [facility] Team stating MRI of the brain has been denied  She stated the insurance company is requiring the order to also be with [employer]  We need to place a new order with the following:    MRI Brain with IAC     Will notify patient of this and to postpone MRI appt  Please place new order

## 2023-02-20 ENCOUNTER — OFFICE VISIT (OUTPATIENT)
Dept: OBGYN CLINIC | Facility: CLINIC | Age: 28
End: 2023-02-20

## 2023-02-20 ENCOUNTER — HOSPITAL ENCOUNTER (OUTPATIENT)
Dept: RADIOLOGY | Facility: CLINIC | Age: 28
Discharge: HOME | End: 2023-02-20

## 2023-02-20 VITALS
HEART RATE: 84 BPM | DIASTOLIC BLOOD PRESSURE: 80 MMHG | WEIGHT: 274 LBS | TEMPERATURE: 97.6 F | BODY MASS INDEX: 41.52 KG/M2 | HEIGHT: 68 IN | SYSTOLIC BLOOD PRESSURE: 120 MMHG

## 2023-02-20 DIAGNOSIS — M76.822 TIBIALIS POSTERIOR TENDINITIS, LEFT: ICD-10-CM

## 2023-02-20 DIAGNOSIS — M25.572 CHRONIC PAIN OF LEFT ANKLE: ICD-10-CM

## 2023-02-20 DIAGNOSIS — S93.409A SPRAIN OF ANKLE, INITIAL ENCOUNTER: ICD-10-CM

## 2023-02-20 DIAGNOSIS — M25.572 CHRONIC PAIN OF LEFT ANKLE: Primary | ICD-10-CM

## 2023-02-20 DIAGNOSIS — G89.29 CHRONIC PAIN OF LEFT ANKLE: ICD-10-CM

## 2023-02-20 DIAGNOSIS — M89.9 LESION OF BONE OF ANKLE: ICD-10-CM

## 2023-02-20 DIAGNOSIS — G89.29 CHRONIC PAIN OF LEFT ANKLE: Primary | ICD-10-CM

## 2023-02-20 NOTE — PROGRESS NOTES
1  Chronic pain of left ankle  XR ankle 3+ vw left    MRI ankle/heel left w wo contrast    Ambulatory Referral to Physical Therapy    Brace      2  Sprain of ankle, initial encounter  XR ankle 3+ vw left    MRI ankle/heel left w wo contrast    Ambulatory Referral to Physical Therapy    Brace      3  Tibialis posterior tendinitis, left  XR ankle 3+ vw left    MRI ankle/heel left w wo contrast    Ambulatory Referral to Physical Therapy    Brace      4  Lesion of bone of ankle  MRI ankle/heel left w wo contrast    Ambulatory Referral to Physical Therapy    Brace    Talus        Orders Placed This Encounter   Procedures   • Brace   • XR ankle 3+ vw left   • MRI ankle/heel left w wo contrast   • Ambulatory Referral to Physical Therapy        Imaging Studies (I personally reviewed images in PACS and report):    • X-ray left ankle 2/20/2023: Large lucent lesion on lateral views of the talus  Otherwise mortise is intact  No significant degenerative changes  IMPRESSION:  • Acute on chronic left ankle pain - ongoing since preteen per patient  • Reported history of recurrent ankle inversion/eversion injuries- recently aggravated approximately 1 month ago in which patient states she was unable to weight-bear for at least 3 days  On her own, she had used a high tide Cam boot for 3 days and progressively had improvement of her symptoms but continues to have pain mainly over the medial aspect of her ankle  • Radiographic imaging showing 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, giant cell tumor  • Differential includes pain from cyst, tibialis posterior tendinitis, chronic ankle laxity secondary to recurrent ankle sprains      Other factors:  • BMI 41 6    PLAN:    • Clinical exam and radiographic imaging reviewed with patient today, with impression as per above   I have discussed with the patient the pathophysiology of this diagnosis and reviewed how the examination correlates with this diagnosis  • Imaging obtained of her left ankle today as noted above  We will follow official radiology interpretation  I do note a large lucent lesion within her talus  • MRI of her left ankle with and without contrast ordered today for further evaluation of lesion  • Recommended conservative treatment for now while waiting for the MRI  • Referred to formal physical therapy for at least a minimum of 6 weeks  • Prescribed her lace up ankle brace to be used during strenuous activities  Return for Follow up after MRI of left ankle  Portions of the record may have been created with voice recognition software  Occasional wrong word or "sound a like" substitutions may have occurred due to the inherent limitations of voice recognition software  Read the chart carefully and recognize, using context, where substitutions have occurred  CHIEF COMPLAINT:  Chief Complaint   Patient presents with   • Left Ankle - Pain         HPI:  Jeremias Taylor is a 32 y o  female  who presents for       Visit 2/20/2023 :  Patient evaluation of acute on chronic left ankle pain/injury:  Of note, patient has a BMI of 41  Of note she also states she has a history of recurrent "ankle sprains" since she was a preteen  She states that she does not wear an ankle brace while being active  She reports she would typically treat conservatively with rest, icing, elevation and sometimes using her high tide CAM boot  She states she did have an ankle x-ray 3 years ago that reportedly was unremarkable  She reports he most recently aggravated/injured her ankle approximately 1 month ago while walking on uneven terrain  She states that she twisted her ankle in a way that caused severe pain over the medial aspect of her ankle and was unable to tolerate weightbearing  She states she then treated on her own and transitioned herself to a cam boot for about 3 days before transitioning out on her own into regular footwear    She states she continues to have pain mainly localized over the medial aspect of her ankle that radiates to the medial aspect of her distal lower leg  She describes as a sharp pain  Denies any N/T, bruising  She states minimal swelling  In regards to pain control she reports limited relief with acetaminophen, NSAIDs  She has not seen formal physical therapy for this issue before  I am unable to review her prior imaging of her ankle  She denies prior surgeries of her left ankle in the past         Medical, Surgical, Family, and Social History    Past Medical History:   Diagnosis Date   • Endometriosis    • History of severe acute respiratory syndrome coronavirus 2 (SARS-CoV-2) disease 7/21/2022    Note: 1/2022   • Peptic ulcer    • Postviral syndrome 10/17/2022     Past Surgical History:   Procedure Laterality Date   • ADENOIDECTOMY     • APPENDECTOMY     • TONSILLECTOMY       Social History   Social History     Substance and Sexual Activity   Alcohol Use Not Currently    Comment: occasional     Social History     Substance and Sexual Activity   Drug Use Never     Social History     Tobacco Use   Smoking Status Never   Smokeless Tobacco Never     Family History   Problem Relation Age of Onset   • No Known Problems Mother    • No Known Problems Father      Allergies   Allergen Reactions   • Sulfa Antibiotics Hives          Physical Exam  /80   Pulse 84   Temp 97 6 °F (36 4 °C) (Temporal)   Ht 5' 8" (1 727 m)   Wt 124 kg (274 lb)   BMI 41 66 kg/m²     Constitutional:  see vital signs  Gen: well-developed, normocephalic/atraumatic, well-groomed  Pulmonary/Chest: Effort normal  No respiratory distress         Ortho Exam   Ankle Examination (focused):     Gait: no limp      LEFT   Inspection Erythema none    Edema none    Ecchymosis none        ROM:  Plantarflexion 50    Dorsiflexion 20        Strength Pronation 5/5    Supination 4/5    Foot plantarflexion 4/5    Foot dorsflexion 5/5        TTP AiTFL no    ATFL no CFL no    PTFL no    Achilles no    Deltoid +    Peroneal no    Tib Ant no    Tib Post +        TTP (Bony) Prox Fibula no    Lat Malleolus no    Base of 5th MT no    Med Malleolus no    Navicular no    Talar Dome no        Anterior Drawer ATFL negative   Calcaneal Squeeze  negative   Tib-Fib Squeeze Test  negative   Talar Tilt (stab tib,DF foot,invert foot) CFL negative   ER Stress (stab tib,ER foot) High ankle negative   Eversion stress (stab tib, benito foot) deltoid positive   Single foot heel rise PTTD positive   Tinel's   negative   MT Compression  negative         No calf tenderness to palpation     LE NV Exam: +2 DP/PT pulses   Sensation intact to light touch            Procedures

## 2023-02-23 ENCOUNTER — TELEPHONE (OUTPATIENT)
Dept: FAMILY MEDICINE CLINIC | Facility: CLINIC | Age: 28
End: 2023-02-23

## 2023-02-23 NOTE — TELEPHONE ENCOUNTER
Patient did not see the referral gave her the name and number of Dr  On the referral, she will call to schedule appointment

## 2023-02-23 NOTE — TELEPHONE ENCOUNTER
Patient called to give an update on her vertigo  She has been good for awhile with having any issues up until yesterday when she had about 6 episodes of vertigo that lasted from 5pm last evening until this morning  She didn't do anything out of the ordinary that would have triggered the episode  She wanted to give an update and see if you have any suggestions for her on her vertigo

## 2023-02-27 ENCOUNTER — HOSPITAL ENCOUNTER (OUTPATIENT)
Dept: MRI IMAGING | Facility: HOSPITAL | Age: 28
Discharge: HOME | End: 2023-02-27
Attending: STUDENT IN AN ORGANIZED HEALTH CARE EDUCATION/TRAINING PROGRAM

## 2023-02-27 DIAGNOSIS — S93.409A SPRAIN OF ANKLE, INITIAL ENCOUNTER: ICD-10-CM

## 2023-02-27 DIAGNOSIS — M89.9 LESION OF BONE OF ANKLE: ICD-10-CM

## 2023-02-27 DIAGNOSIS — M25.572 CHRONIC PAIN OF LEFT ANKLE: ICD-10-CM

## 2023-02-27 DIAGNOSIS — G89.29 CHRONIC PAIN OF LEFT ANKLE: ICD-10-CM

## 2023-02-27 DIAGNOSIS — M76.822 TIBIALIS POSTERIOR TENDINITIS, LEFT: ICD-10-CM

## 2023-02-27 RX ADMIN — GADOBUTROL 12 ML: 604.72 INJECTION INTRAVENOUS at 15:28

## 2023-03-01 ENCOUNTER — OFFICE VISIT (OUTPATIENT)
Dept: OBGYN CLINIC | Facility: CLINIC | Age: 28
End: 2023-03-01

## 2023-03-01 VITALS
TEMPERATURE: 98.1 F | WEIGHT: 277 LBS | DIASTOLIC BLOOD PRESSURE: 88 MMHG | BODY MASS INDEX: 41.98 KG/M2 | SYSTOLIC BLOOD PRESSURE: 130 MMHG | HEIGHT: 68 IN | HEART RATE: 94 BPM

## 2023-03-01 DIAGNOSIS — G89.29 CHRONIC PAIN OF LEFT ANKLE: ICD-10-CM

## 2023-03-01 DIAGNOSIS — M89.9 LESION OF BONE OF ANKLE: Primary | ICD-10-CM

## 2023-03-01 DIAGNOSIS — M25.572 CHRONIC PAIN OF LEFT ANKLE: ICD-10-CM

## 2023-03-01 RX ORDER — IBUPROFEN 200 MG
TABLET ORAL EVERY 6 HOURS PRN
COMMUNITY

## 2023-03-01 NOTE — PROGRESS NOTES
1  Lesion of bone of ankle  Ambulatory Referral to Orthopedic Surgery      2  Chronic pain of left ankle  Ambulatory Referral to Orthopedic Surgery        Orders Placed This Encounter   Procedures   • Ambulatory Referral to Orthopedic Surgery        Imaging Studies (I personally reviewed images in PACS and report):    • MRI left ankle/heel with and without contrast 2/27/2023:  Enhancing lobulated slightly expansile distal talar bone lesion corresponding to recent plain film findings  Lesion enhancement means aggressive pathology is difficult to exclude and tissue sampling will likely be required for definitive   characterization  Enchondroma versus low-grade chondrosarcoma, with the latter not excluded, remain primary diagnostic considerations  Fibrous dysplasia could potentially have this appearance as well  IMPRESSION:  • Acute on chronic left ankle pain - ongoing since preteen per patient  • Reported history of recurrent ankle inversion/eversion injuries- recently aggravated approximately 1 month ago in which patient states she was unable to weight-bear for at least 3 days  On her own, she had been using a high tide Cam boot; patient was also prescribed a lace up ankle brace as well  • Recent MRI noting talar bone lesion with radiographic suggestion for potential further biopsy/evaluation    Other factors:  • BMI 41 6    PLAN:    • Clinical exam and radiographic imaging reviewed with patient today, with impression as per above  I have discussed with the patient the pathophysiology of this diagnosis and reviewed how the examination correlates with this diagnosis  • MRI imaging of her ankle reviewed today as noted above  • Recommended at this time for further evaluation formal orthopedic oncology with Dr Gabriel Negron  Patient agreeable as well and referral was placed today  • Patient can continue as needed use of her lace up ankle brace or high tide Cam boot while weightbearing as needed      Return for Follow up with Dr Criselda Todd in [place]  Portions of the record may have been created with voice recognition software  Occasional wrong word or "sound a like" substitutions may have occurred due to the inherent limitations of voice recognition software  Read the chart carefully and recognize, using context, where substitutions have occurred  CHIEF COMPLAINT:  Chief Complaint   Patient presents with   • Left Ankle - Follow-up   • Follow-up         HPI:  Terese Pemberton is a 32 y o  female  who presents for     Visit 3/1/2023: Follow-up evaluation left ankle pain:  MRI of the ankle/heel with and without contrast reviewed as noted above  At this time, patient is interested in further evaluation going forward if she continues to have moderate to severe intensity medial and anterior ankle sharp pain  She states on uneven terrain she can feel that her ankle will "break" at some point and is concerned for further injury  She continues as needed use of the high tide Cam boot as well as a lace up ankle brace prescribed from last visit which does provide some support and relief  Denies new injury since last visit        Medical, Surgical, Family, and Social History    Past Medical History:   Diagnosis Date   • Endometriosis    • History of severe acute respiratory syndrome coronavirus 2 (SARS-CoV-2) disease 7/21/2022    Note: 1/2022   • Peptic ulcer    • Postviral syndrome 10/17/2022     Past Surgical History:   Procedure Laterality Date   • ADENOIDECTOMY     • APPENDECTOMY     • TONSILLECTOMY       Social History   Social History     Substance and Sexual Activity   Alcohol Use Not Currently    Comment: occasional     Social History     Substance and Sexual Activity   Drug Use Never     Social History     Tobacco Use   Smoking Status Never   Smokeless Tobacco Never     Family History   Problem Relation Age of Onset   • No Known Problems Mother    • No Known Problems Father      Allergies   Allergen Reactions • Sulfa Antibiotics Hives          Physical Exam  /88 (BP Location: Left arm)   Pulse 94   Temp 98 1 °F (36 7 °C) (Temporal)   Ht 5' 8" (1 727 m)   Wt 126 kg (277 lb)   BMI 42 12 kg/m²     Constitutional:  see vital signs  Gen: obese, normocephalic/atraumatic, well-groomed  Pulmonary/Chest: Effort normal  No respiratory distress                   Procedures

## 2023-03-03 ENCOUNTER — OFFICE VISIT (OUTPATIENT)
Dept: OBGYN CLINIC | Facility: CLINIC | Age: 28
End: 2023-03-03

## 2023-03-03 ENCOUNTER — PREP FOR PROCEDURE (OUTPATIENT)
Dept: INTERVENTIONAL RADIOLOGY/VASCULAR | Facility: HOSPITAL | Age: 28
End: 2023-03-03

## 2023-03-03 VITALS
WEIGHT: 277.6 LBS | DIASTOLIC BLOOD PRESSURE: 80 MMHG | HEART RATE: 109 BPM | BODY MASS INDEX: 42.07 KG/M2 | SYSTOLIC BLOOD PRESSURE: 124 MMHG | HEIGHT: 68 IN

## 2023-03-03 DIAGNOSIS — M89.9 LESION OF BONE OF FOOT: Primary | ICD-10-CM

## 2023-03-03 DIAGNOSIS — M89.9 LESION OF BONE OF ANKLE: ICD-10-CM

## 2023-03-03 DIAGNOSIS — Z01.818 PRE-OP TESTING: ICD-10-CM

## 2023-03-03 DIAGNOSIS — M25.572 CHRONIC PAIN OF LEFT ANKLE: ICD-10-CM

## 2023-03-03 DIAGNOSIS — D48.0 NEOPLASM OF UNCERTAIN BEHAVIOR OF BONE AND ARTICULAR CARTILAGE: Primary | ICD-10-CM

## 2023-03-03 DIAGNOSIS — G89.29 CHRONIC PAIN OF LEFT ANKLE: ICD-10-CM

## 2023-03-03 RX ORDER — SODIUM CHLORIDE 9 MG/ML
30 INJECTION, SOLUTION INTRAVENOUS CONTINUOUS
Status: CANCELLED | OUTPATIENT
Start: 2023-03-03

## 2023-03-03 NOTE — H&P (VIEW-ONLY)
ORTHOPEDIC ONCOLOGY NEW PATIENT NOTE    Patient: Brianne Smith   Age: 32 y o  Sex: female  Gender: female  Date of visit: 3/3/2023   Referring provider: Robert Jeronimo MD  Patient Care Team:  Ari Ch MD as PCP - General (Family Medicine)   Fax# [phone]      CHIEF COMPLAINT   Chief Complaint   Patient presents with   • Left Ankle - Pain     Left ankle and left heel        Brianne Smith is a 32 y o  female with history of recurrent ankle sprains who presents for consultation at the request of Dr Venancio Waldron regarding lesion of left talus  Chronic spraining of ankle with inversion every time, pain to medial ankle radiating up medial tibia    At baseline patient gaits without assistance  Has pain on push off     Denies constitutional symptoms such as fever, chills, night sweats, fatigue, weight gains/losses  Denies chest pain/shortness of breath  Patient denies personal history of cancer      Occupation: mainly at desk     Past Medical History:   Diagnosis Date   • Endometriosis    • History of severe acute respiratory syndrome coronavirus 2 (SARS-CoV-2) disease 7/21/2022    Note: 1/2022   • Peptic ulcer    • Postviral syndrome 10/17/2022     Past Surgical History:   Procedure Laterality Date   • ADENOIDECTOMY     • APPENDECTOMY     • TONSILLECTOMY         Current Outpatient Medications:   •  albuterol (Ventolin HFA) 90 mcg/act inhaler, Inhale 2 puffs every 6 (six) hours as needed for wheezing, Disp: 18 g, Rfl: 3  •  citalopram (CeleXA) 40 mg tablet, Take 1 tablet (40 mg total) by mouth daily, Disp: 90 tablet, Rfl: 3  •  fluticasone (FLONASE) 50 mcg/act nasal spray, , Disp: , Rfl:   •  ibuprofen (MOTRIN) 200 mg tablet, Take by mouth every 6 (six) hours as needed for mild pain, Disp: , Rfl:   •  ALPRAZolam (XANAX) 0 25 mg tablet, Take 1 tablet (0 25 mg total) by mouth 3 (three) times a day as needed for anxiety (Patient not taking: Reported on 3/1/2023), Disp: 60 tablet, Rfl: 1    Current Facility-Administered Medications:   •  meclizine (ANTIVERT) tablet 25 mg, 25 mg, Oral, Q8H PRN, Linh Solorzano MD  Allergies   Allergen Reactions   • Sulfa Antibiotics Hives     Family History   Problem Relation Age of Onset   • No Known Problems Mother    • No Known Problems Father      Social History     Socioeconomic History   • Marital status: /Civil Union     Spouse name: Not on file   • Number of children: Not on file   • Years of education: Not on file   • Highest education level: Not on file   Occupational History   • Not on file   Tobacco Use   • Smoking status: Never   • Smokeless tobacco: Never   Vaping Use   • Vaping Use: Never used   Substance and Sexual Activity   • Alcohol use: Not Currently     Comment: occasional   • Drug use: Never   • Sexual activity: Yes     Partners: Male     Birth control/protection: None   Other Topics Concern   • Not on file   Social History Narrative   • Not on file     Social Determinants of Health     Financial Resource Strain: Not on file   Food Insecurity: Not on file   Transportation Needs: Not on file   Physical Activity: Not on file   Stress: Not on file   Social Connections: Not on file   Intimate Partner Violence: Not on file   Housing Stability: Not on file       REVIEW OF SYSTEMS  Allergies, medications, past medical/surgical/family/social history have been reviewed  Complete 12 system review performed and found to be negative except: except as per mentioned in HPI  PHYSICAL EXAMINATION  Height: 5' 8" (172 7 cm)  Weight - Scale: 126 kg (277 lb 9 6 oz)  BMI (Calculated): 42 2  BSA (Calculated - m2): 2 35 sq meters     Vitals:    03/03/23 1327   BP: 124/80   Pulse: (!) 109     Body mass index is 42 21 kg/m²  General: alert and oriented; well nourished/well developed; no apparent distress  Present with   Psychiatric: normal mood and affect  HEENT: NCAT  Head/neck - full range of motion     Lungs: breathing comfortably; equal symmetric chest expansion  Abdomen: soft, non-tender, non-distended  Skin: warm; dry; no lesions, rashes, petechiae or purpura; no clubbing, no cyanosis, no edema, no palpable masses  Extremities: Left leg   Inspection: no edema, skin abnormalities throughout   Palpation: no palpable masses or lesions, +TTP over medial talus   Range of motion of joints: full range of motion all extremities  Motor strength: WNL all extremities  Dorsal/Plantar flexion: intact  Sensation: grossly intact to all extremities  Pulses: present  Lymphatics: (-) lymphadenopathy  Gait: normal gait  Left ankle:   Medial aspect of talus significantly TTP  DF/PF 5/5  Negative anterior drawer  Unable to plantarflex against gravity during gait    IMAGING RESULTS, All images personally review today by Dr Felix Mosquera  Study: MRI w wo  Area: left ankle/heel  Date: 2/27/23  Impression: Enhancing lobulated slightly expansile distal talar bone lesion corresponding to recent plain film findings  Lesion enhancement means aggressive pathology is difficult to exclude and tissue sampling will likely be required for definitive   characterization  Differential includes giant cell tumor of bone, chondromyxoid fibroma, aneurysmal bone cyst, low-grade chondrosarcoma/enchondroma although I do not see significant calcifications, unicameral bone cyst, eosinophilic granuloma or possibly a sarcoma  Study: XR  Area: left ankle  Date: 2/20/23  Impression: Possible simple bone cyst in the anterior talus  Further imaging if clinical concern  No acute osseous abnormality       Laboratory:  Lab Results   Component Value Date/Time    WBC 9 70 07/22/2022 09:41 AM    HGB 14 6 07/22/2022 09:41 AM    HCT 44 7 07/22/2022 09:41 AM     (H) 07/22/2022 09:41 AM     Lab Results   Component Value Date/Time    K 4 3 07/22/2022 09:41 AM    CO2 24 07/22/2022 09:41 AM     (H) 07/22/2022 09:41 AM    BUN 13 07/22/2022 09:41 AM    CREATININE 0 80 07/22/2022 09:41 AM    CALCIUM 9 2 07/22/2022 09:41 AM       Pathology  None at this time    Review of referring provider's records:  Jill Lopez MD  Date: 3/1/23  Impression:   • Path/labs:Acute on chronic left ankle pain - ongoing since preteen per patient  • Reported history of recurrent ankle inversion/eversion injuries- recently aggravated approximately 1 month ago in which patient states she was unable to weight-bear for at least 3 days  On her own, she had been using a high tide Cam boot; patient was also prescribed a lace up ankle brace as well  · Recent MRI noting talar bone lesion with radiographic suggestion for potential further biopsy/evaluation        IMPRESSION/PLAN: Shira Holliday is a 32 y o  female with:    1  Bony lesion of left talus  - stable since 2020, which is reassuring for limited suspicion for   - discussed could be latent process  - ddx includes giant cell tumor of bone, chondromyxoidfibroma, fibrous dysplasia   - possible change in managemetn surgicvally, cement vs bone graft   - proceed with biopsy with IR (discussed with Dr Rod Junior)  - proceed based on biopsy results for possible excision with Dr Jim Anthony at [facility]   - discussed that she is trying to conceive, and to pause the process while awaiting decision for surgical management   - recommended limiting weight bearing through the ankle, and to utilize CAM boot at home from previous injuries for ambulating  - workup labs ordered: SPEP, CBC, CMP, APTT, PT/INR, CRP,ESR,   - CT ankle ordered for further evaluation  - WBBS ordered  - patient was agreeable to the plan discussed above    FOLLOW UP: No follow-ups on file       45 minutes was spent in the coordination of care, reviewing of imaging and with the patient on the date of service    Scribe Attestation    I,:  [facility] am acting as a scribe while in the presence of the attending physician :       I,:  Bogdan Brunner DO personally performed the services described in this documentation as scribed in my presence :            James Winslow PA-C   3/3/2023 4:15 PM

## 2023-03-03 NOTE — PROGRESS NOTES
ORTHOPEDIC ONCOLOGY NEW PATIENT NOTE    Patient: George Liu   Age: 32 y o  Sex: female  Gender: female  Date of visit: 3/3/2023   Referring provider: Booker Bose MD  Patient Care Team:  Denice Love MD as PCP - General (Family Medicine)   Fax# [phone]      CHIEF COMPLAINT   Chief Complaint   Patient presents with   • Left Ankle - Pain     Left ankle and left heel        George Liu is a 32 y o  female with history of recurrent ankle sprains who presents for consultation at the request of Dr Silviano Fitzpatrick regarding lesion of left talus  Chronic spraining of ankle with inversion every time, pain to medial ankle radiating up medial tibia    At baseline patient gaits without assistance  Has pain on push off     Denies constitutional symptoms such as fever, chills, night sweats, fatigue, weight gains/losses  Denies chest pain/shortness of breath  Patient denies personal history of cancer      Occupation: mainly at desk     Past Medical History:   Diagnosis Date   • Endometriosis    • History of severe acute respiratory syndrome coronavirus 2 (SARS-CoV-2) disease 7/21/2022    Note: 1/2022   • Peptic ulcer    • Postviral syndrome 10/17/2022     Past Surgical History:   Procedure Laterality Date   • ADENOIDECTOMY     • APPENDECTOMY     • TONSILLECTOMY         Current Outpatient Medications:   •  albuterol (Ventolin HFA) 90 mcg/act inhaler, Inhale 2 puffs every 6 (six) hours as needed for wheezing, Disp: 18 g, Rfl: 3  •  citalopram (CeleXA) 40 mg tablet, Take 1 tablet (40 mg total) by mouth daily, Disp: 90 tablet, Rfl: 3  •  fluticasone (FLONASE) 50 mcg/act nasal spray, , Disp: , Rfl:   •  ibuprofen (MOTRIN) 200 mg tablet, Take by mouth every 6 (six) hours as needed for mild pain, Disp: , Rfl:   •  ALPRAZolam (XANAX) 0 25 mg tablet, Take 1 tablet (0 25 mg total) by mouth 3 (three) times a day as needed for anxiety (Patient not taking: Reported on 3/1/2023), Disp: 60 tablet, Rfl: 1    Current Facility-Administered Medications:   •  meclizine (ANTIVERT) tablet 25 mg, 25 mg, Oral, Q8H PRN, Reyes Ly, MD  Allergies   Allergen Reactions   • Sulfa Antibiotics Hives     Family History   Problem Relation Age of Onset   • No Known Problems Mother    • No Known Problems Father      Social History     Socioeconomic History   • Marital status: /Civil Union     Spouse name: Not on file   • Number of children: Not on file   • Years of education: Not on file   • Highest education level: Not on file   Occupational History   • Not on file   Tobacco Use   • Smoking status: Never   • Smokeless tobacco: Never   Vaping Use   • Vaping Use: Never used   Substance and Sexual Activity   • Alcohol use: Not Currently     Comment: occasional   • Drug use: Never   • Sexual activity: Yes     Partners: Male     Birth control/protection: None   Other Topics Concern   • Not on file   Social History Narrative   • Not on file     Social Determinants of Health     Financial Resource Strain: Not on file   Food Insecurity: Not on file   Transportation Needs: Not on file   Physical Activity: Not on file   Stress: Not on file   Social Connections: Not on file   Intimate Partner Violence: Not on file   Housing Stability: Not on file       REVIEW OF SYSTEMS  Allergies, medications, past medical/surgical/family/social history have been reviewed  Complete 12 system review performed and found to be negative except: except as per mentioned in HPI  PHYSICAL EXAMINATION  Height: 5' 8" (172 7 cm)  Weight - Scale: 126 kg (277 lb 9 6 oz)  BMI (Calculated): 42 2  BSA (Calculated - m2): 2 35 sq meters     Vitals:    03/03/23 1327   BP: 124/80   Pulse: (!) 109     Body mass index is 42 21 kg/m²  General: alert and oriented; well nourished/well developed; no apparent distress  Present with   Psychiatric: normal mood and affect  HEENT: NCAT  Head/neck - full range of motion     Lungs: breathing comfortably; equal symmetric chest expansion  Abdomen: soft, non-tender, non-distended  Skin: warm; dry; no lesions, rashes, petechiae or purpura; no clubbing, no cyanosis, no edema, no palpable masses  Extremities: Left leg   Inspection: no edema, skin abnormalities throughout   Palpation: no palpable masses or lesions, +TTP over medial talus   Range of motion of joints: full range of motion all extremities  Motor strength: WNL all extremities  Dorsal/Plantar flexion: intact  Sensation: grossly intact to all extremities  Pulses: present  Lymphatics: (-) lymphadenopathy  Gait: normal gait  Left ankle:   Medial aspect of talus significantly TTP  DF/PF 5/5  Negative anterior drawer  Unable to plantarflex against gravity during gait    IMAGING RESULTS, All images personally review today by Dr Felix Mosquera  Study: MRI w wo  Area: left ankle/heel  Date: 2/27/23  Impression: Enhancing lobulated slightly expansile distal talar bone lesion corresponding to recent plain film findings  Lesion enhancement means aggressive pathology is difficult to exclude and tissue sampling will likely be required for definitive   characterization  Differential includes giant cell tumor of bone, chondromyxoid fibroma, aneurysmal bone cyst, low-grade chondrosarcoma/enchondroma although I do not see significant calcifications, unicameral bone cyst, eosinophilic granuloma or possibly a sarcoma  Study: XR  Area: left ankle  Date: 2/20/23  Impression: Possible simple bone cyst in the anterior talus  Further imaging if clinical concern  No acute osseous abnormality       Laboratory:  Lab Results   Component Value Date/Time    WBC 9 70 07/22/2022 09:41 AM    HGB 14 6 07/22/2022 09:41 AM    HCT 44 7 07/22/2022 09:41 AM     (H) 07/22/2022 09:41 AM     Lab Results   Component Value Date/Time    K 4 3 07/22/2022 09:41 AM    CO2 24 07/22/2022 09:41 AM     (H) 07/22/2022 09:41 AM    BUN 13 07/22/2022 09:41 AM    CREATININE 0 80 07/22/2022 09:41 AM    CALCIUM 9 2 07/22/2022 09:41 AM       Pathology  None at this time    Review of referring provider's records:  Lizzy Castorena MD  Date: 3/1/23  Impression:   • Path/labs:Acute on chronic left ankle pain - ongoing since preteen per patient  • Reported history of recurrent ankle inversion/eversion injuries- recently aggravated approximately 1 month ago in which patient states she was unable to weight-bear for at least 3 days  On her own, she had been using a high tide Cam boot; patient was also prescribed a lace up ankle brace as well  · Recent MRI noting talar bone lesion with radiographic suggestion for potential further biopsy/evaluation        IMPRESSION/PLAN: Yoni Fonseca is a 32 y o  female with:    1  Bony lesion of left talus  - stable since 2020, which is reassuring for limited suspicion for   - discussed could be latent process  - ddx includes giant cell tumor of bone, chondromyxoidfibroma, fibrous dysplasia   - possible change in managemetn surgicvally, cement vs bone graft   - proceed with biopsy with IR (discussed with Dr George Martinez)  - proceed based on biopsy results for possible excision with Dr Felix [facility]quera at [facility]   - discussed that she is trying to conceive, and to pause the process while awaiting decision for surgical management   - recommended limiting weight bearing through the ankle, and to utilize CAM boot at home from previous injuries for ambulating  - workup labs ordered: SPEP, CBC, CMP, APTT, PT/INR, CRP,ESR,   - CT ankle ordered for further evaluation  - WBBS ordered  - patient was agreeable to the plan discussed above    FOLLOW UP: No follow-ups on file       45 minutes was spent in the coordination of care, reviewing of imaging and with the patient on the date of service    Scribe Attestation    I,:  [facility] am acting as a scribe while in the presence of the attending physician :       I,:  Isela Rondon, DO personally performed the services described in this documentation as scribed in my presence :            Ericka Verduzco PA-C   3/3/2023 4:15 PM

## 2023-03-07 ENCOUNTER — OFFICE VISIT (OUTPATIENT)
Dept: FAMILY MEDICINE CLINIC | Facility: CLINIC | Age: 28
End: 2023-03-07

## 2023-03-07 ENCOUNTER — APPOINTMENT (OUTPATIENT)
Dept: LAB | Facility: CLINIC | Age: 28
End: 2023-03-07

## 2023-03-07 VITALS
OXYGEN SATURATION: 97 % | DIASTOLIC BLOOD PRESSURE: 77 MMHG | TEMPERATURE: 98 F | WEIGHT: 278.8 LBS | HEIGHT: 68 IN | BODY MASS INDEX: 42.25 KG/M2 | HEART RATE: 89 BPM | SYSTOLIC BLOOD PRESSURE: 122 MMHG

## 2023-03-07 DIAGNOSIS — M89.9 LESION OF BONE OF ANKLE: ICD-10-CM

## 2023-03-07 DIAGNOSIS — M25.572 CHRONIC PAIN OF LEFT ANKLE: ICD-10-CM

## 2023-03-07 DIAGNOSIS — J02.0 PHARYNGITIS DUE TO STREPTOCOCCUS SPECIES: Primary | ICD-10-CM

## 2023-03-07 DIAGNOSIS — Z01.818 PRE-OP TESTING: ICD-10-CM

## 2023-03-07 DIAGNOSIS — J45.990 EXERCISE-INDUCED ASTHMA: ICD-10-CM

## 2023-03-07 DIAGNOSIS — D48.0 NEOPLASM OF UNCERTAIN BEHAVIOR OF BONE AND ARTICULAR CARTILAGE: ICD-10-CM

## 2023-03-07 DIAGNOSIS — G89.29 CHRONIC PAIN OF LEFT ANKLE: ICD-10-CM

## 2023-03-07 LAB
ALBUMIN SERPL BCP-MCNC: 3.9 G/DL (ref 3.5–5)
ALP SERPL-CCNC: 111 U/L (ref 46–116)
ALT SERPL W P-5'-P-CCNC: 33 U/L (ref 12–78)
ANION GAP SERPL CALCULATED.3IONS-SCNC: 2 MMOL/L (ref 4–13)
APTT PPP: 28 SECONDS (ref 23–37)
AST SERPL W P-5'-P-CCNC: 22 U/L (ref 5–45)
BASOPHILS # BLD AUTO: 0.07 THOUSANDS/ÂΜL (ref 0–0.1)
BASOPHILS NFR BLD AUTO: 1 % (ref 0–1)
BILIRUB SERPL-MCNC: 0.39 MG/DL (ref 0.2–1)
BUN SERPL-MCNC: 10 MG/DL (ref 5–25)
CALCIUM SERPL-MCNC: 9.2 MG/DL (ref 8.3–10.1)
CHLORIDE SERPL-SCNC: 107 MMOL/L (ref 96–108)
CO2 SERPL-SCNC: 27 MMOL/L (ref 21–32)
CREAT SERPL-MCNC: 0.6 MG/DL (ref 0.6–1.3)
CRP SERPL QL: 42 MG/L
EOSINOPHIL # BLD AUTO: 0.11 THOUSAND/ÂΜL (ref 0–0.61)
EOSINOPHIL NFR BLD AUTO: 1 % (ref 0–6)
ERYTHROCYTE [DISTWIDTH] IN BLOOD BY AUTOMATED COUNT: 12.4 % (ref 11.6–15.1)
ERYTHROCYTE [SEDIMENTATION RATE] IN BLOOD: 15 MM/HOUR (ref 0–19)
GFR SERPL CREATININE-BSD FRML MDRD: 125 ML/MIN/1.73SQ M
GLUCOSE P FAST SERPL-MCNC: 100 MG/DL (ref 65–99)
HCT VFR BLD AUTO: 44 % (ref 34.8–46.1)
HGB BLD-MCNC: 14.6 G/DL (ref 11.5–15.4)
IMM GRANULOCYTES # BLD AUTO: 0.04 THOUSAND/UL (ref 0–0.2)
IMM GRANULOCYTES NFR BLD AUTO: 0 % (ref 0–2)
INR PPP: 0.94 (ref 0.84–1.19)
LYMPHOCYTES # BLD AUTO: 2.35 THOUSANDS/ÂΜL (ref 0.6–4.47)
LYMPHOCYTES NFR BLD AUTO: 19 % (ref 14–44)
MCH RBC QN AUTO: 28.2 PG (ref 26.8–34.3)
MCHC RBC AUTO-ENTMCNC: 33.2 G/DL (ref 31.4–37.4)
MCV RBC AUTO: 85 FL (ref 82–98)
MONOCYTES # BLD AUTO: 1.03 THOUSAND/ÂΜL (ref 0.17–1.22)
MONOCYTES NFR BLD AUTO: 8 % (ref 4–12)
NEUTROPHILS # BLD AUTO: 8.98 THOUSANDS/ÂΜL (ref 1.85–7.62)
NEUTS SEG NFR BLD AUTO: 71 % (ref 43–75)
NRBC BLD AUTO-RTO: 0 /100 WBCS
PLATELET # BLD AUTO: 392 THOUSANDS/UL (ref 149–390)
PMV BLD AUTO: 10.3 FL (ref 8.9–12.7)
POTASSIUM SERPL-SCNC: 5 MMOL/L (ref 3.5–5.3)
PROT SERPL-MCNC: 7 G/DL (ref 6.4–8.4)
PROTHROMBIN TIME: 12.8 SECONDS (ref 11.6–14.5)
RBC # BLD AUTO: 5.18 MILLION/UL (ref 3.81–5.12)
SODIUM SERPL-SCNC: 136 MMOL/L (ref 135–147)
WBC # BLD AUTO: 12.58 THOUSAND/UL (ref 4.31–10.16)

## 2023-03-07 RX ORDER — ALBUTEROL SULFATE 90 UG/1
2 AEROSOL, METERED RESPIRATORY (INHALATION) EVERY 6 HOURS PRN
Qty: 18 G | Refills: 3 | Status: SHIPPED | OUTPATIENT
Start: 2023-03-07

## 2023-03-07 RX ORDER — AMOXICILLIN 500 MG/1
500 CAPSULE ORAL EVERY 8 HOURS SCHEDULED
Qty: 30 CAPSULE | Refills: 0 | Status: SHIPPED | OUTPATIENT
Start: 2023-03-07 | End: 2023-03-17

## 2023-03-07 NOTE — PROGRESS NOTES
Assessment/Plan:       1  Pharyngitis due to Streptococcus species  Comments:  stat amoxicillin  Orders:  -     amoxicillin (AMOXIL) 500 mg capsule; Take 1 capsule (500 mg total) by mouth every 8 (eight) hours for 10 days    2  Exercise-induced asthma  -     albuterol (Ventolin HFA) 90 mcg/act inhaler; Inhale 2 puffs every 6 (six) hours as needed for wheezing          Subjective:      Patient ID: Be Eubanks is a 32 y o  female  Gerard Remy has a 1 day hx of bad sore throat/no fever/cough/post nasal drip  No aches  She has a headache  She was around 2 people with strep throat  Cough  Associated symptoms include headaches and a sore throat  Headache  Sore Throat   Associated symptoms include coughing and headaches  The following portions of the patient's history were reviewed and updated as appropriate: allergies, current medications, past family history, past medical history, past social history, past surgical history, and problem list     Review of Systems   HENT: Positive for sore throat  Respiratory: Positive for cough  Neurological: Positive for headaches  Objective:      /77 (BP Location: Left arm, Patient Position: Sitting, Cuff Size: Large)   Pulse 89   Temp 98 °F (36 7 °C)   Ht 5' 8" (1 727 m)   Wt 126 kg (278 lb 12 8 oz)   SpO2 97%   BMI 42 39 kg/m²          Physical Exam  Vitals and nursing note reviewed  Constitutional:       Appearance: Normal appearance  HENT:      Head: Normocephalic and atraumatic  Nose: Nose normal       Mouth/Throat:      Mouth: Mucous membranes are moist       Comments: Franki- red with white exudate  Eyes:      Extraocular Movements: Extraocular movements intact  Pupils: Pupils are equal, round, and reactive to light  Cardiovascular:      Rate and Rhythm: Normal rate and regular rhythm  Pulses: Normal pulses  Pulmonary:      Effort: Pulmonary effort is normal       Breath sounds: Normal breath sounds     Abdominal: General: Bowel sounds are normal       Palpations: Abdomen is soft  Musculoskeletal:      Cervical back: Normal range of motion  Skin:     General: Skin is warm and dry  Capillary Refill: Capillary refill takes less than 2 seconds  Neurological:      General: No focal deficit present  Mental Status: She is alert     Psychiatric:         Mood and Affect: Mood normal

## 2023-03-09 ENCOUNTER — HOSPITAL ENCOUNTER (OUTPATIENT)
Dept: CT IMAGING | Facility: HOSPITAL | Age: 28
Discharge: HOME | End: 2023-03-09
Attending: RADIOLOGY

## 2023-03-09 VITALS
OXYGEN SATURATION: 94 % | HEART RATE: 84 BPM | SYSTOLIC BLOOD PRESSURE: 129 MMHG | DIASTOLIC BLOOD PRESSURE: 75 MMHG | TEMPERATURE: 98.4 F | RESPIRATION RATE: 20 BRPM

## 2023-03-09 DIAGNOSIS — M89.9 LESION OF BONE OF FOOT: ICD-10-CM

## 2023-03-09 LAB
ALBUMIN SERPL ELPH-MCNC: 4.01 G/DL (ref 3.5–5)
ALBUMIN SERPL ELPH-MCNC: 59 % (ref 52–65)
ALPHA1 GLOB SERPL ELPH-MCNC: 0.35 G/DL (ref 0.1–0.4)
ALPHA1 GLOB SERPL ELPH-MCNC: 5.1 % (ref 2.5–5)
ALPHA2 GLOB SERPL ELPH-MCNC: 0.87 G/DL (ref 0.4–1.2)
ALPHA2 GLOB SERPL ELPH-MCNC: 12.8 % (ref 7–13)
BETA GLOB ABNORMAL SERPL ELPH-MCNC: 0.4 G/DL (ref 0.4–0.8)
BETA1 GLOB SERPL ELPH-MCNC: 5.9 % (ref 5–13)
BETA2 GLOB SERPL ELPH-MCNC: 5.6 % (ref 2–8)
BETA2+GAMMA GLOB SERPL ELPH-MCNC: 0.38 G/DL (ref 0.2–0.5)
EXT PREGNANCY TEST URINE: NEGATIVE
EXT. CONTROL: NORMAL
GAMMA GLOB ABNORMAL SERPL ELPH-MCNC: 0.79 G/DL (ref 0.5–1.6)
GAMMA GLOB SERPL ELPH-MCNC: 11.6 % (ref 12–22)
IGG/ALB SER: 1.44 {RATIO} (ref 1.1–1.8)
PROT PATTERN SERPL ELPH-IMP: ABNORMAL
PROT SERPL-MCNC: 6.8 G/DL (ref 6.4–8.2)

## 2023-03-09 RX ORDER — MIDAZOLAM HYDROCHLORIDE 2 MG/2ML
INJECTION, SOLUTION INTRAMUSCULAR; INTRAVENOUS AS NEEDED
Status: COMPLETED | OUTPATIENT
Start: 2023-03-09 | End: 2023-03-09

## 2023-03-09 RX ORDER — FENTANYL CITRATE 50 UG/ML
INJECTION, SOLUTION INTRAMUSCULAR; INTRAVENOUS AS NEEDED
Status: COMPLETED | OUTPATIENT
Start: 2023-03-09 | End: 2023-03-09

## 2023-03-09 RX ORDER — LIDOCAINE HYDROCHLORIDE 10 MG/ML
INJECTION, SOLUTION EPIDURAL; INFILTRATION; INTRACAUDAL; PERINEURAL AS NEEDED
Status: COMPLETED | OUTPATIENT
Start: 2023-03-09 | End: 2023-03-09

## 2023-03-09 RX ORDER — SODIUM CHLORIDE 9 MG/ML
30 INJECTION, SOLUTION INTRAVENOUS CONTINUOUS
Status: DISCONTINUED | OUTPATIENT
Start: 2023-03-09 | End: 2023-03-10 | Stop reason: HOSPADM

## 2023-03-09 RX ORDER — ONDANSETRON 2 MG/ML
INJECTION INTRAMUSCULAR; INTRAVENOUS AS NEEDED
Status: COMPLETED | OUTPATIENT
Start: 2023-03-09 | End: 2023-03-09

## 2023-03-09 RX ADMIN — MIDAZOLAM 0.5 MG: 1 INJECTION INTRAMUSCULAR; INTRAVENOUS at 09:24

## 2023-03-09 RX ADMIN — MIDAZOLAM 0.5 MG: 1 INJECTION INTRAMUSCULAR; INTRAVENOUS at 09:13

## 2023-03-09 RX ADMIN — FENTANYL CITRATE 50 MCG: 50 INJECTION, SOLUTION INTRAMUSCULAR; INTRAVENOUS at 08:37

## 2023-03-09 RX ADMIN — MIDAZOLAM 1 MG: 1 INJECTION INTRAMUSCULAR; INTRAVENOUS at 08:45

## 2023-03-09 RX ADMIN — FENTANYL CITRATE 50 MCG: 50 INJECTION, SOLUTION INTRAMUSCULAR; INTRAVENOUS at 08:45

## 2023-03-09 RX ADMIN — FENTANYL CITRATE 50 MCG: 50 INJECTION, SOLUTION INTRAMUSCULAR; INTRAVENOUS at 08:55

## 2023-03-09 RX ADMIN — FENTANYL CITRATE 25 MCG: 50 INJECTION, SOLUTION INTRAMUSCULAR; INTRAVENOUS at 09:24

## 2023-03-09 RX ADMIN — FENTANYL CITRATE 25 MCG: 50 INJECTION, SOLUTION INTRAMUSCULAR; INTRAVENOUS at 09:13

## 2023-03-09 RX ADMIN — ONDANSETRON 4 MG: 2 INJECTION INTRAMUSCULAR; INTRAVENOUS at 08:22

## 2023-03-09 RX ADMIN — LIDOCAINE HYDROCHLORIDE 10 ML: 10 INJECTION, SOLUTION EPIDURAL; INFILTRATION; INTRACAUDAL; PERINEURAL at 08:54

## 2023-03-09 RX ADMIN — MIDAZOLAM 1 MG: 1 INJECTION INTRAMUSCULAR; INTRAVENOUS at 08:37

## 2023-03-09 RX ADMIN — MIDAZOLAM 1 MG: 1 INJECTION INTRAMUSCULAR; INTRAVENOUS at 08:55

## 2023-03-09 NOTE — INTERVAL H&P NOTE
Update: (This section must be completed if the H&P was completed greater than 24 hrs to procedure or admission)    H&P reviewed  After examining the patient, I find no changed to the H&P since it had been written  Visit Vitals  /72   Pulse 84   Temp 98 5 °F (36 9 °C) (Oral)   Resp 14   SpO2 95%   OB Status Having periods   Smoking Status Never         Patient re-evaluated   Accept as history and physical     Vishal Cabrera MD/March 9, 2023/9:41 AM

## 2023-03-09 NOTE — DISCHARGE INSTRUCTIONS
POST BIOPSY    Care after your procedure:    1  Limit your activities for 24 hours after your biopsy  2  No driving day of biopsy  3  Return to your normal diet  Small sips of flat soda will help with mild nausea  4  Remove band-aid or dressing 24 hours after procedure  Contact Interventional Radiology at [phone] Patrick PATIENTS: Contact Interventional Radiology at [phone]) Stephy Marcos PATIENTS: Contact Interventional Radiology at [phone]) if:    1  Difficulty breathing, nausea or vomiting  2  Chills or fever above 101 degrees F      3  Pain at biopsy site not relieved by medication  4  Develop any redness, swelling, heat, unusual drainage, heavy bruising or bleeding from biopsy site  Procedural Sedation   WHAT YOU NEED TO KNOW:   Procedural sedation is medicine used during procedures to help you feel relaxed and calm  You will remember little to none of the procedure  After sedation you may feel tired, weak, or unsteady on your feet  You may also have trouble concentrating or short-term memory loss  These symptoms should go away in 24 hours or less  DISCHARGE INSTRUCTIONS:     Call 911 or have someone else call for any of the following: You have sudden trouble breathing  You cannot be woken  Contact Interventional Radiology at [phone]   Patrick PATIENTS: Contact Interventional Radiology at [phone]) Stephy Marcos PATIENTS: Contact Interventional Radiology at [phone]) if any of the following occur: You have a severe headache or dizziness  Your heart is beating faster than usual     You have a fever or chills  Your skin is itchy, swollen, or you have a rash  You have nausea or are vomiting for more than 8 hours after the procedure  You have questions or concerns about your condition or care  Self-care:   Have someone stay with you for 24 hours  This person can drive you to errands and help you do things around the house   This person can also watch for problems  Rest and do quiet activities for 24 hours  Do not exercise, ride a bike, or play sports  Stand up slowly to prevent dizziness and falls  Take short walks around the house with another person  Slowly return to your usual activities the next day  Do not drive or use dangerous machines or tools for 24 hours  You may injure yourself or others  Examples include a lawnmower, saw, or drill  Do not return to work for 24 hours if you use dangerous machines or tools for work  Do not make important decisions for 24 hours  For example, do not sign important papers or invest money  Drink liquids as directed  Liquids help flush the sedation medicine out of your body  Ask how much liquid to drink each day and which liquids are best for you  Eat small, frequent meals to prevent nausea and vomiting  Start with clear liquids such as juice or broth  If you do not vomit after clear liquids, you can eat your usual foods  Do not drink alcohol or take medicines that make you drowsy  This includes medicines that help you sleep and anxiety medicines  Ask your healthcare provider if it is safe for you to take pain medicine  Follow up with your healthcare provider as directed: Write down your questions so you remember to ask them during your visits

## 2023-03-09 NOTE — BRIEF OP NOTE (RAD/CATH)
INTERVENTIONAL RADIOLOGY PROCEDURE NOTE    Date: 3/9/2023    Procedure:   Procedure Summary     Date: 03/09/23 Room / Location: [facility] [address] CAT Scan    Anesthesia Start:  Anesthesia Stop:     Procedure: IR BIOPSY BONE Diagnosis:       Lesion of bone of foot      (talus lesion)    Scheduled Providers: Eleanor Crum MD Responsible Provider:     Anesthesia Type: Not recorded ASA Status: Not recorded          Preoperative diagnosis:   1  Lesion of bone of foot         Postoperative diagnosis: Same  Surgeon: Eleanor Crum MD     Assistant: None  No qualified resident was available  Blood loss: Minimal    Specimens: Yes     Findings: Successful CT-guided 10/14G coaxial bone biopsy of left anterior talus cystic lesion  Complications: None immediate      Anesthesia: conscious sedation and local

## 2023-03-09 NOTE — SEDATION DOCUMENTATION
CT guided left talus lesion biopsy completed in IR by Dr Elena Che  [facility] start time [id number]

## 2023-03-10 ENCOUNTER — PATIENT MESSAGE (OUTPATIENT)
Dept: FAMILY MEDICINE CLINIC | Facility: CLINIC | Age: 28
End: 2023-03-10

## 2023-03-10 ENCOUNTER — HOSPITAL ENCOUNTER (OUTPATIENT)
Dept: CT IMAGING | Facility: HOSPITAL | Age: 28
End: 2023-03-10

## 2023-03-10 DIAGNOSIS — R05.1 ACUTE COUGH: Primary | ICD-10-CM

## 2023-03-10 DIAGNOSIS — Z01.818 PRE-OP TESTING: ICD-10-CM

## 2023-03-10 DIAGNOSIS — M89.9 LESION OF BONE OF ANKLE: ICD-10-CM

## 2023-03-10 DIAGNOSIS — M25.572 CHRONIC PAIN OF LEFT ANKLE: ICD-10-CM

## 2023-03-10 DIAGNOSIS — D48.0 NEOPLASM OF UNCERTAIN BEHAVIOR OF BONE AND ARTICULAR CARTILAGE: ICD-10-CM

## 2023-03-10 DIAGNOSIS — G89.29 CHRONIC PAIN OF LEFT ANKLE: ICD-10-CM

## 2023-03-10 RX ORDER — BROMPHENIRAMINE MALEATE, PSEUDOEPHEDRINE HYDROCHLORIDE, AND DEXTROMETHORPHAN HYDROBROMIDE 2; 30; 10 MG/5ML; MG/5ML; MG/5ML
5 SYRUP ORAL 4 TIMES DAILY PRN
Qty: 473 ML | Refills: 1 | Status: SHIPPED | OUTPATIENT
Start: 2023-03-10

## 2023-03-15 ENCOUNTER — HOSPITAL ENCOUNTER (OUTPATIENT)
Dept: NUCLEAR MEDICINE | Facility: HOSPITAL | Age: 28
Discharge: HOME | End: 2023-03-15

## 2023-03-15 DIAGNOSIS — M89.9 LESION OF BONE OF ANKLE: ICD-10-CM

## 2023-03-15 DIAGNOSIS — Z01.818 PRE-OP TESTING: ICD-10-CM

## 2023-03-15 DIAGNOSIS — M25.572 CHRONIC PAIN OF LEFT ANKLE: ICD-10-CM

## 2023-03-15 DIAGNOSIS — G89.29 CHRONIC PAIN OF LEFT ANKLE: ICD-10-CM

## 2023-03-15 DIAGNOSIS — D48.0 NEOPLASM OF UNCERTAIN BEHAVIOR OF BONE AND ARTICULAR CARTILAGE: ICD-10-CM

## 2023-03-21 ENCOUNTER — OFFICE VISIT (OUTPATIENT)
Dept: OBGYN CLINIC | Facility: CLINIC | Age: 28
End: 2023-03-21

## 2023-03-21 VITALS
DIASTOLIC BLOOD PRESSURE: 80 MMHG | SYSTOLIC BLOOD PRESSURE: 128 MMHG | WEIGHT: 279 LBS | HEIGHT: 68 IN | BODY MASS INDEX: 42.28 KG/M2

## 2023-03-21 DIAGNOSIS — D48.0 GIANT CELL TUMOR OF BONE: Primary | ICD-10-CM

## 2023-03-21 RX ORDER — CHLORHEXIDINE GLUCONATE 4 G/100ML
SOLUTION TOPICAL DAILY PRN
Status: CANCELLED | OUTPATIENT
Start: 2023-03-29

## 2023-03-21 RX ORDER — CEFAZOLIN SODIUM 2 G/50ML
2000 SOLUTION INTRAVENOUS ONCE
Status: CANCELLED | OUTPATIENT
Start: 2023-03-29 | End: 2023-03-21

## 2023-03-21 NOTE — PROGRESS NOTES
ORTHOPEDIC ONCOLOGY FOLLOW UP NOTE    Date of visit: 3/21/2023     Chief Complaint   Patient presents with   • Left Foot - Follow-up     Ct scan, MRI , and Biopsy   • Left Ankle - Follow-up      Diagnosis ICD-10-CM Associated Orders   1  Giant cell tumor of bone  D48 0 Case request operating room: REMOVAL TUMOR BONE     Case request operating room: REMOVAL TUMOR BONE          IMPRESSION/PLAN: Neris Marie is a 32 y o  female with left ankle lesion    1  Giant cell tumor of bone, left talus  - benign diagnosis, but can still be erosive, leading to damage of bone  - discussed plan to scrape out bone and remove tumor and add argon beam to decrease recurrence  - planned recover with limited weight bearing 6-8 weeks  - recommended not driving/driving clutch following surgery   - discussed risk regarding talar blood supply and healing   - will encourage not working for first week after surgery, to encourage strict icing and elevating with foot above your heart  - continue elevating/icing when possible following recovery     Surgical planning:  - Medial vs anterior and medial approach  - Argon beam to reduce risk of recurrence   - use cement to fill space due to known diagnosis, and see recurrence if needed   - 2 incisions for percutaneous screws posteriorly  - nauseous from anaesthesia, zofran   - also wishes to avoid narcotics, nausea     Comorbidity include: GERD; Asthma; obesity BMI 30-34 9; vertigo; chronic pain and recurrent sprains of both ankles   Continue current management of all above diagnoses       No problem-specific Assessment & Plan notes found for this encounter  FOLLOW UP: Return for surgery  PROCEDURE:   No procedures performed    SURGICAL CONSENT:   The patient has failed non operative measures and has opted for surgical intervention  Risks and benefits of the treatment options and surgery were discussed in detail with the patient by Dr Aparna Tripp   The risks of surgery including but not limited to infection, bleeding, injury to nerves, injury to the vessels, excess scar tissue formation, risk of failure of the procedure, the possible need for further surgery, and potential risk of loss of limb and life  The patient understood this and elects to proceed forward with surgical intervention  The patient has opted for surgical intervention and informed consent was obtained  - discussed risk of nerve damage/pain with surgery, especially due to nature of swelling  We will proceed forward with resection of tumor of left talus     ___________________________________________________________________    Stanford Rodríguez is a 32 y o  female with hx of recurrent ankle sprains; Sp IR guided bx of left talus  Patient is doing well overall  denies pain/issue   Patient has been able to tolerate daily normal activities without any issues or complaints since the last office visit  Patient Denies fevers, chills, numbness/tingling, injury/trauma, night sweats since the last office visit  Patient is currently following with: IR, completed biopsy  Medication changes: None    REVIEW OF SYSTEMS  Allergies, medications, past medical/surgical/family/social history have been reviewed, with the following changes: none  Complete 12 system review performed and found to be negative except: except as per mentioned in HPI  PHYSICAL EXAMINATION  Height: 5' 8" (172 7 cm)  Weight - Scale: 127 kg (279 lb)  BMI (Calculated): 42 4  BSA (Calculated - m2): 2 35 sq meters     Vitals:    03/21/23 1522   BP: 128/80     Body mass index is 42 42 kg/m²  Present today with   General: alert and oriented;  well nourished/well developed; no apparent distress  Psychiatric: normal mood and affect  HEENT: NCAT  Head/neck: full range of motion  Lungs: breathing comfortably  ; equal symmetric chest expansion  Abdomen: soft, non-tender, non-distended     Skin: warm; dry; without  lesions, rashes, petechiae or purpura; without  clubbing, cyanosis, edema, or  palpable masses  Extremities: LLE   Inspection: without edema, without skin abnormalities throughout   Palpation:  with  TTP   Range of motion of joints: full range of motion all extremities  Motor strength:  WNL all extremities    Sensation: grossly intact to all extremities  Pulses: 2+ pedal pulses  Left ankle:  Biopsy site noted anterior to medial malleolus , healing well  IMAGING RESULTS, All images personally review today by Dr Radha Gutierrez  Study: WBBS  Date: 3/15/23  Impression: IMPRESSION:     1  Increased radiotracer uptake on 3 phases at the left talus corresponding to the known osseous lesion  Increased radiotracer uptake is nonspecific and can be seen with benign or malignant osseous lesions  2   Mild focus of uptake at the right ankle joint level posteriorly, non-specific, may be degenerative  Consider x-rays as warranted  3   Otherwise, no additional foci of uptake to suggest a metastatic process         Study: CT L ankle  Date: 3/10/23  Impression: OSSEOUS STRUCTURES:       Redemonstrated is a lytic, soft-tissue density (possibly blood products) lesion centered in the talar head that measures approximately 2 3 x 1 7 x 2 6 cm (AP by proximodistal by transverse; images #2/102 and #401/35)  There are areas of cortical   thinning and possible cortical breakthrough (image #401/35)  No associated soft tissue mass      No fracture or dislocation      VISUALIZED MUSCULATURE:  Unremarkable      SOFT TISSUES:  Unremarkable      OTHER PERTINENT FINDINGS:  None          Laboratory:  Lab Results   Component Value Date/Time    WBC 12 58 (H) 03/07/2023 08:53 AM    HGB 14 6 03/07/2023 08:53 AM    HCT 44 0 03/07/2023 08:53 AM     (H) 03/07/2023 08:53 AM     Lab Results   Component Value Date/Time    K 5 0 03/07/2023 08:53 AM    CO2 27 03/07/2023 08:53 AM     03/07/2023 08:53 AM    BUN 10 03/07/2023 08:53 AM    CREATININE 0 60 03/07/2023 08:53 AM    CALCIUM 9 2 03/07/2023 08:53 AM         Pathology  Giant Cell Tumor of Bone (confirmed)      Patient Care Team:  Kateryna Lopes MD as PCP - General (Family Medicine)   _____________________________________________________________________    30 minutes was spent in the coordination of care, reviewing of imaging and with the patient on the date of service    Scribe Attestation    I,:  Enoch Fernandes PA-C am acting as a scribe while in the presence of the attending physician :       I,:  Carolyn Mendoza DO personally performed the services described in this documentation    as scribed in my presence :            Enoch Fernandes PA-C   3/21/2023 4:05 PM

## 2023-03-21 NOTE — H&P (VIEW-ONLY)
ORTHOPEDIC ONCOLOGY FOLLOW UP NOTE    Date of visit: 3/21/2023     Chief Complaint   Patient presents with   • Left Foot - Follow-up     Ct scan, MRI , and Biopsy   • Left Ankle - Follow-up      Diagnosis ICD-10-CM Associated Orders   1  Giant cell tumor of bone  D48 0 Case request operating room: REMOVAL TUMOR BONE     Case request operating room: REMOVAL TUMOR BONE          IMPRESSION/PLAN: Dalia Wolf is a 32 y o  female with left ankle lesion    1  Giant cell tumor of bone, left talus  - benign diagnosis, but can still be erosive, leading to damage of bone  - discussed plan to scrape out bone and remove tumor and add argon beam to decrease recurrence  - planned recover with limited weight bearing 6-8 weeks  - recommended not driving/driving clutch following surgery   - discussed risk regarding talar blood supply and healing   - will encourage not working for first week after surgery, to encourage strict icing and elevating with foot above your heart  - continue elevating/icing when possible following recovery     Surgical planning:  - Medial vs anterior and medial approach  - Argon beam to reduce risk of recurrence   - use cement to fill space due to known diagnosis, and see recurrence if needed   - 2 incisions for percutaneous screws posteriorly  - nauseous from anaesthesia, zofran   - also wishes to avoid narcotics, nausea     Comorbidity include: GERD; Asthma; obesity BMI 30-34 9; vertigo; chronic pain and recurrent sprains of both ankles   Continue current management of all above diagnoses       No problem-specific Assessment & Plan notes found for this encounter  FOLLOW UP: Return for surgery  PROCEDURE:   No procedures performed    SURGICAL CONSENT:   The patient has failed non operative measures and has opted for surgical intervention  Risks and benefits of the treatment options and surgery were discussed in detail with the patient by Dr Edwige Dubon   The risks of surgery including but not "limited to infection, bleeding, injury to nerves, injury to the vessels, excess scar tissue formation, risk of failure of the procedure, the possible need for further surgery, and potential risk of loss of limb and life  The patient understood this and elects to proceed forward with surgical intervention  The patient has opted for surgical intervention and informed consent was obtained  - discussed risk of nerve damage/pain with surgery, especially due to nature of swelling  We will proceed forward with resection of tumor of left talus     ___________________________________________________________________    Marianna Bettencourt is a 32 y o  female with hx of recurrent ankle sprains; Sp IR guided bx of left talus  Patient is doing well overall  denies pain/issue   Patient has been able to tolerate daily normal activities without any issues or complaints since the last office visit  Patient Denies fevers, chills, numbness/tingling, injury/trauma, night sweats since the last office visit  Patient is currently following with: IR, completed biopsy  Medication changes: None    REVIEW OF SYSTEMS  Allergies, medications, past medical/surgical/family/social history have been reviewed, with the following changes: none  Complete 12 system review performed and found to be negative except: except as per mentioned in HPI  PHYSICAL EXAMINATION  Height: 5' 8\" (172 7 cm)  Weight - Scale: 127 kg (279 lb)  BMI (Calculated): 42 4  BSA (Calculated - m2): 2 35 sq meters     Vitals:    03/21/23 1522   BP: 128/80     Body mass index is 42 42 kg/m²  Present today with   General: alert and oriented;  well nourished/well developed; no apparent distress  Psychiatric: normal mood and affect  HEENT: NCAT  Head/neck: full range of motion  Lungs: breathing comfortably  ; equal symmetric chest expansion  Abdomen: soft, non-tender, non-distended     Skin: warm; dry; without  lesions, rashes, petechiae or " purpura; without  clubbing, cyanosis, edema, or  palpable masses  Extremities: LLE   Inspection: without edema, without skin abnormalities throughout   Palpation:  with  TTP   Range of motion of joints: full range of motion all extremities  Motor strength:  WNL all extremities    Sensation: grossly intact to all extremities  Pulses: 2+ pedal pulses  Left ankle:  Biopsy site noted anterior to medial malleolus , healing well  IMAGING RESULTS, All images personally review today by Dr Charles Dunn  Study: WBBS  Date: 3/15/23  Impression: IMPRESSION:     1  Increased radiotracer uptake on 3 phases at the left talus corresponding to the known osseous lesion  Increased radiotracer uptake is nonspecific and can be seen with benign or malignant osseous lesions  2   Mild focus of uptake at the right ankle joint level posteriorly, non-specific, may be degenerative  Consider x-rays as warranted  3   Otherwise, no additional foci of uptake to suggest a metastatic process         Study: CT L ankle  Date: 3/10/23  Impression: OSSEOUS STRUCTURES:       Redemonstrated is a lytic, soft-tissue density (possibly blood products) lesion centered in the talar head that measures approximately 2 3 x 1 7 x 2 6 cm (AP by proximodistal by transverse; images #2/102 and #401/35)  There are areas of cortical   thinning and possible cortical breakthrough (image #401/35)  No associated soft tissue mass      No fracture or dislocation      VISUALIZED MUSCULATURE:  Unremarkable      SOFT TISSUES:  Unremarkable      OTHER PERTINENT FINDINGS:  None          Laboratory:  Lab Results   Component Value Date/Time    WBC 12 58 (H) 03/07/2023 08:53 AM    HGB 14 6 03/07/2023 08:53 AM    HCT 44 0 03/07/2023 08:53 AM     (H) 03/07/2023 08:53 AM     Lab Results   Component Value Date/Time    K 5 0 03/07/2023 08:53 AM    CO2 27 03/07/2023 08:53 AM     03/07/2023 08:53 AM    BUN 10 03/07/2023 08:53 AM    CREATININE 0 60 03/07/2023 08:53 AM    CALCIUM 9 2 03/07/2023 08:53 AM         Pathology  Giant Cell Tumor of Bone (confirmed)      Patient Care Team:  Landon García MD as PCP - General (Family Medicine)   _____________________________________________________________________    30 minutes was spent in the coordination of care, reviewing of imaging and with the patient on the date of service    Scribe Attestation    I,:  Enoch Fernandes PA-C am acting as a scribe while in the presence of the attending physician :       I,:  Laura Paulino DO personally performed the services described in this documentation    as scribed in my presence :            Enoch Fernandes PA-C   3/21/2023 4:05 PM

## 2023-03-23 ENCOUNTER — ANESTHESIA EVENT (OUTPATIENT)
Dept: PERIOP | Facility: HOSPITAL | Age: 28
End: 2023-03-23

## 2023-03-23 RX ORDER — MECLIZINE HYDROCHLORIDE 25 MG/1
TABLET ORAL 3 TIMES DAILY PRN
COMMUNITY

## 2023-03-23 NOTE — PRE-PROCEDURE INSTRUCTIONS
Pre-Surgery Instructions:   Medication Instructions   • albuterol (Ventolin HFA) 90 mcg/act inhaler Uses PRN- OK to take day of surgery   • ALPRAZolam (XANAX) 0 25 mg tablet Uses PRN- OK to take day of surgery   • citalopram (CeleXA) 40 mg tablet Take day of surgery  • fluticasone (FLONASE) 50 mcg/act nasal spray Take day of surgery  • ibuprofen (MOTRIN) 200 mg tablet Stop taking 5 days prior to surgery  • meclizine (ANTIVERT) 25 mg tablet Uses PRN- OK to take day of surgery   • Prenatal Multivit-Min-Fe-FA (PRE-DELMAR PO) Stop taking 5 days prior to surgery  Medication instructions for day surgery reviewed  Please use only a sip of water to take your instructed medications  Avoid all over the counter vitamins, supplements and NSAIDS for one week prior to surgery per anesthesia guidelines  Tylenol is ok to take as needed  You will receive a call one business day prior to surgery with an arrival time and hospital directions  If your surgery is scheduled on a Monday, the hospital will be calling you on the Friday prior to your surgery  If you have not heard from anyone by 8pm, please call the hospital supervisor through the hospital  at [phone]  [facility] [phone])  Do not eat or drink anything after midnight the night before your surgery, including candy, mints, lifesavers, or chewing gum  Do not drink alcohol 24hrs before your surgery  Try not to smoke at least 24hrs before your surgery  Follow the pre surgery showering instructions as listed in the [facility] Surgical Experience Booklet” or otherwise provided by your surgeon's office  Do not shave the surgical area 24 hours before surgery  Do not apply any lotions, creams, including makeup, cologne, deodorant, or perfumes after showering on the day of your surgery  No contact lenses, eye make-up, or artificial eyelashes  Remove nail polish, including gel polish, and any artificial, gel, or acrylic nails if possible   Remove all jewelry including rings and body piercing jewelry  Wear causal clothing that is easy to take on and off  Consider your type of surgery  Keep any valuables, jewelry, piercings at home  Please bring any specially ordered equipment (sling, braces) if indicated  Arrange for a responsible person to drive you to and from the hospital on the day of your surgery  Visitor Guidelines discussed  Call the surgeon's office with any new illnesses, exposures, or additional questions prior to surgery  Please reference your [facility] Surgical Experience Booklet” for additional information to prepare for your upcoming surgery

## 2023-03-29 ENCOUNTER — HOSPITAL ENCOUNTER (OUTPATIENT)
Dept: RADIOLOGY | Facility: HOSPITAL | Age: 28
Setting detail: SURGERY ADMIT
Discharge: HOME | End: 2023-03-29

## 2023-03-29 ENCOUNTER — ANESTHESIA (OUTPATIENT)
Dept: PERIOP | Facility: HOSPITAL | Age: 28
End: 2023-03-29

## 2023-03-29 ENCOUNTER — HOSPITAL ENCOUNTER (INPATIENT)
Facility: HOSPITAL | Age: 28
LOS: 1 days | Discharge: HOME | End: 2023-03-29
Attending: STUDENT IN AN ORGANIZED HEALTH CARE EDUCATION/TRAINING PROGRAM | Admitting: STUDENT IN AN ORGANIZED HEALTH CARE EDUCATION/TRAINING PROGRAM

## 2023-03-29 VITALS
RESPIRATION RATE: 16 BRPM | WEIGHT: 276.9 LBS | SYSTOLIC BLOOD PRESSURE: 132 MMHG | TEMPERATURE: 97.9 F | BODY MASS INDEX: 42.1 KG/M2 | OXYGEN SATURATION: 96 % | HEART RATE: 95 BPM | DIASTOLIC BLOOD PRESSURE: 81 MMHG

## 2023-03-29 DIAGNOSIS — R11.2 PONV (POSTOPERATIVE NAUSEA AND VOMITING): Primary | ICD-10-CM

## 2023-03-29 DIAGNOSIS — D48.0 GIANT CELL TUMOR OF BONE: ICD-10-CM

## 2023-03-29 DIAGNOSIS — R42 VERTIGO: ICD-10-CM

## 2023-03-29 DIAGNOSIS — Z98.890 PONV (POSTOPERATIVE NAUSEA AND VOMITING): Primary | ICD-10-CM

## 2023-03-29 PROBLEM — E66.01 MORBID OBESITY WITH BMI OF 40.0-44.9, ADULT (HCC): Status: ACTIVE | Noted: 2023-03-29

## 2023-03-29 LAB
EXT PREGNANCY TEST URINE: NEGATIVE
EXT. CONTROL: NORMAL

## 2023-03-29 PROCEDURE — 0QSM04Z REPOSITION LEFT TARSAL WITH INTERNAL FIXATION DEVICE, OPEN APPROACH: ICD-10-PCS | Performed by: STUDENT IN AN ORGANIZED HEALTH CARE EDUCATION/TRAINING PROGRAM

## 2023-03-29 PROCEDURE — 0QBM0ZX EXCISION OF LEFT TARSAL, OPEN APPROACH, DIAGNOSTIC: ICD-10-PCS | Performed by: STUDENT IN AN ORGANIZED HEALTH CARE EDUCATION/TRAINING PROGRAM

## 2023-03-29 DEVICE — IMPLANTABLE DEVICE: Type: IMPLANTABLE DEVICE | Site: FOOT | Status: FUNCTIONAL

## 2023-03-29 DEVICE — SMARTSET HIGH PERFORMANCE MV MEDIUM VISCOSITY BONE CEMENT 40G
Type: IMPLANTABLE DEVICE | Site: FOOT | Status: FUNCTIONAL
Brand: SMARTSET

## 2023-03-29 RX ORDER — CITALOPRAM 20 MG/1
40 TABLET ORAL DAILY
Status: DISCONTINUED | OUTPATIENT
Start: 2023-03-29 | End: 2023-03-29

## 2023-03-29 RX ORDER — CEFAZOLIN SODIUM 1 G/3ML
INJECTION, POWDER, FOR SOLUTION INTRAMUSCULAR; INTRAVENOUS AS NEEDED
Status: DISCONTINUED | OUTPATIENT
Start: 2023-03-29 | End: 2023-03-29

## 2023-03-29 RX ORDER — NAPROXEN 250 MG/1
500 TABLET ORAL EVERY 12 HOURS SCHEDULED
Status: DISCONTINUED | OUTPATIENT
Start: 2023-03-29 | End: 2023-03-29 | Stop reason: HOSPADM

## 2023-03-29 RX ORDER — SENNOSIDES 8.6 MG
650 CAPSULE ORAL EVERY 8 HOURS
Qty: 45 TABLET | Refills: 0 | Status: SHIPPED | OUTPATIENT
Start: 2023-03-29 | End: 2023-04-13

## 2023-03-29 RX ORDER — ASPIRIN 81 MG/1
81 TABLET ORAL 2 TIMES DAILY
Qty: 28 TABLET | Refills: 0 | Status: SHIPPED | OUTPATIENT
Start: 2023-03-29 | End: 2023-04-12

## 2023-03-29 RX ORDER — SENNOSIDES 8.6 MG
1 TABLET ORAL DAILY
Status: CANCELLED | OUTPATIENT
Start: 2023-03-29

## 2023-03-29 RX ORDER — FENTANYL CITRATE/PF 50 MCG/ML
25 SYRINGE (ML) INJECTION
Status: COMPLETED | OUTPATIENT
Start: 2023-03-29 | End: 2023-03-29

## 2023-03-29 RX ORDER — DEXMEDETOMIDINE HYDROCHLORIDE 100 UG/ML
INJECTION, SOLUTION INTRAVENOUS AS NEEDED
Status: DISCONTINUED | OUTPATIENT
Start: 2023-03-29 | End: 2023-03-29

## 2023-03-29 RX ORDER — PROPOFOL 10 MG/ML
INJECTION, EMULSION INTRAVENOUS CONTINUOUS PRN
Status: DISCONTINUED | OUTPATIENT
Start: 2023-03-29 | End: 2023-03-29

## 2023-03-29 RX ORDER — SODIUM CHLORIDE 9 MG/ML
125 INJECTION, SOLUTION INTRAVENOUS CONTINUOUS
Status: DISCONTINUED | OUTPATIENT
Start: 2023-03-29 | End: 2023-03-29 | Stop reason: HOSPADM

## 2023-03-29 RX ORDER — FENTANYL CITRATE 50 UG/ML
INJECTION, SOLUTION INTRAMUSCULAR; INTRAVENOUS AS NEEDED
Status: DISCONTINUED | OUTPATIENT
Start: 2023-03-29 | End: 2023-03-29

## 2023-03-29 RX ORDER — TRAMADOL HYDROCHLORIDE 50 MG/1
50 TABLET ORAL EVERY 6 HOURS PRN
Qty: 40 TABLET | Refills: 0 | Status: SHIPPED | OUTPATIENT
Start: 2023-03-29 | End: 2023-04-08

## 2023-03-29 RX ORDER — LIDOCAINE HYDROCHLORIDE 20 MG/ML
INJECTION, SOLUTION EPIDURAL; INFILTRATION; INTRACAUDAL; PERINEURAL AS NEEDED
Status: DISCONTINUED | OUTPATIENT
Start: 2023-03-29 | End: 2023-03-29

## 2023-03-29 RX ORDER — TRAMADOL HYDROCHLORIDE 50 MG/1
50 TABLET ORAL EVERY 6 HOURS PRN
Status: DISCONTINUED | OUTPATIENT
Start: 2023-03-29 | End: 2023-03-29 | Stop reason: HOSPADM

## 2023-03-29 RX ORDER — MECLIZINE HYDROCHLORIDE 25 MG/1
25 TABLET ORAL EVERY 8 HOURS PRN
Status: DISCONTINUED | OUTPATIENT
Start: 2023-03-29 | End: 2023-03-29 | Stop reason: HOSPADM

## 2023-03-29 RX ORDER — SODIUM CHLORIDE, SODIUM LACTATE, POTASSIUM CHLORIDE, CALCIUM CHLORIDE 600; 310; 30; 20 MG/100ML; MG/100ML; MG/100ML; MG/100ML
INJECTION, SOLUTION INTRAVENOUS CONTINUOUS PRN
Status: DISCONTINUED | OUTPATIENT
Start: 2023-03-29 | End: 2023-03-29

## 2023-03-29 RX ORDER — KETOROLAC TROMETHAMINE 30 MG/ML
INJECTION, SOLUTION INTRAMUSCULAR; INTRAVENOUS AS NEEDED
Status: DISCONTINUED | OUTPATIENT
Start: 2023-03-29 | End: 2023-03-29

## 2023-03-29 RX ORDER — ONDANSETRON 4 MG/1
4 TABLET, FILM COATED ORAL EVERY 8 HOURS PRN
Qty: 20 TABLET | Refills: 0 | Status: SHIPPED | OUTPATIENT
Start: 2023-03-29 | End: 2023-04-05

## 2023-03-29 RX ORDER — CHLORHEXIDINE GLUCONATE 4 G/100ML
SOLUTION TOPICAL DAILY PRN
Status: DISCONTINUED | OUTPATIENT
Start: 2023-03-29 | End: 2023-03-29 | Stop reason: HOSPADM

## 2023-03-29 RX ORDER — ESMOLOL HYDROCHLORIDE 10 MG/ML
INJECTION INTRAVENOUS AS NEEDED
Status: DISCONTINUED | OUTPATIENT
Start: 2023-03-29 | End: 2023-03-29

## 2023-03-29 RX ORDER — SCOLOPAMINE TRANSDERMAL SYSTEM 1 MG/1
1 PATCH, EXTENDED RELEASE TRANSDERMAL ONCE AS NEEDED
Status: DISCONTINUED | OUTPATIENT
Start: 2023-03-29 | End: 2023-03-29 | Stop reason: HOSPADM

## 2023-03-29 RX ORDER — ONDANSETRON 2 MG/ML
4 INJECTION INTRAMUSCULAR; INTRAVENOUS EVERY 6 HOURS PRN
Status: CANCELLED | OUTPATIENT
Start: 2023-03-29

## 2023-03-29 RX ORDER — KETAMINE HYDROCHLORIDE 100 MG/ML
INJECTION INTRAMUSCULAR; INTRAVENOUS AS NEEDED
Status: DISCONTINUED | OUTPATIENT
Start: 2023-03-29 | End: 2023-03-29

## 2023-03-29 RX ORDER — CEFAZOLIN SODIUM 2 G/50ML
2000 SOLUTION INTRAVENOUS ONCE
Status: COMPLETED | OUTPATIENT
Start: 2023-03-29 | End: 2023-03-29

## 2023-03-29 RX ORDER — ONDANSETRON 2 MG/ML
4 INJECTION INTRAMUSCULAR; INTRAVENOUS EVERY 6 HOURS PRN
Status: DISCONTINUED | OUTPATIENT
Start: 2023-03-29 | End: 2023-03-29 | Stop reason: HOSPADM

## 2023-03-29 RX ORDER — ONDANSETRON 2 MG/ML
INJECTION INTRAMUSCULAR; INTRAVENOUS AS NEEDED
Status: DISCONTINUED | OUTPATIENT
Start: 2023-03-29 | End: 2023-03-29

## 2023-03-29 RX ORDER — MIDAZOLAM HYDROCHLORIDE 2 MG/2ML
INJECTION, SOLUTION INTRAMUSCULAR; INTRAVENOUS AS NEEDED
Status: DISCONTINUED | OUTPATIENT
Start: 2023-03-29 | End: 2023-03-29

## 2023-03-29 RX ORDER — DEXAMETHASONE SODIUM PHOSPHATE 10 MG/ML
INJECTION, SOLUTION INTRAMUSCULAR; INTRAVENOUS AS NEEDED
Status: DISCONTINUED | OUTPATIENT
Start: 2023-03-29 | End: 2023-03-29 | Stop reason: HOSPADM

## 2023-03-29 RX ORDER — MAGNESIUM HYDROXIDE 1200 MG/15ML
LIQUID ORAL AS NEEDED
Status: DISCONTINUED | OUTPATIENT
Start: 2023-03-29 | End: 2023-03-29 | Stop reason: HOSPADM

## 2023-03-29 RX ORDER — DEXAMETHASONE SODIUM PHOSPHATE 10 MG/ML
INJECTION, SOLUTION INTRAMUSCULAR; INTRAVENOUS AS NEEDED
Status: DISCONTINUED | OUTPATIENT
Start: 2023-03-29 | End: 2023-03-29

## 2023-03-29 RX ORDER — NAPROXEN 500 MG/1
500 TABLET ORAL 2 TIMES DAILY WITH MEALS
Qty: 30 TABLET | Refills: 0 | Status: SHIPPED | OUTPATIENT
Start: 2023-03-29 | End: 2023-04-13

## 2023-03-29 RX ORDER — CALCIUM CARBONATE 200(500)MG
1000 TABLET,CHEWABLE ORAL DAILY PRN
Status: CANCELLED | OUTPATIENT
Start: 2023-03-29

## 2023-03-29 RX ORDER — ONDANSETRON 2 MG/ML
4 INJECTION INTRAMUSCULAR; INTRAVENOUS ONCE AS NEEDED
Status: DISCONTINUED | OUTPATIENT
Start: 2023-03-29 | End: 2023-03-29 | Stop reason: HOSPADM

## 2023-03-29 RX ORDER — ACETAMINOPHEN 325 MG/1
650 TABLET ORAL EVERY 6 HOURS PRN
Status: DISCONTINUED | OUTPATIENT
Start: 2023-03-29 | End: 2023-03-29 | Stop reason: HOSPADM

## 2023-03-29 RX ORDER — PROPOFOL 10 MG/ML
INJECTION, EMULSION INTRAVENOUS AS NEEDED
Status: DISCONTINUED | OUTPATIENT
Start: 2023-03-29 | End: 2023-03-29

## 2023-03-29 RX ADMIN — FENTANYL CITRATE 50 MCG: 50 INJECTION INTRAMUSCULAR; INTRAVENOUS at 09:15

## 2023-03-29 RX ADMIN — PROPOFOL 300 MG: 10 INJECTION, EMULSION INTRAVENOUS at 08:45

## 2023-03-29 RX ADMIN — FENTANYL CITRATE 25 MCG: 50 INJECTION, SOLUTION INTRAMUSCULAR; INTRAVENOUS at 12:05

## 2023-03-29 RX ADMIN — PROPOFOL 130 MCG/KG/MIN: 10 INJECTION, EMULSION INTRAVENOUS at 08:47

## 2023-03-29 RX ADMIN — FENTANYL CITRATE 25 MCG: 50 INJECTION, SOLUTION INTRAMUSCULAR; INTRAVENOUS at 12:13

## 2023-03-29 RX ADMIN — ESMOLOL HYDROCHLORIDE 20 MG: 100 INJECTION, SOLUTION INTRAVENOUS at 10:07

## 2023-03-29 RX ADMIN — TRAMADOL HYDROCHLORIDE 50 MG: 50 TABLET, COATED ORAL at 14:14

## 2023-03-29 RX ADMIN — LIDOCAINE HYDROCHLORIDE 100 MG: 20 INJECTION, SOLUTION EPIDURAL; INFILTRATION; INTRACAUDAL; PERINEURAL at 08:45

## 2023-03-29 RX ADMIN — KETOROLAC TROMETHAMINE 30 MG: 30 INJECTION, SOLUTION INTRAMUSCULAR at 11:43

## 2023-03-29 RX ADMIN — SCOPALAMINE 1 PATCH: 1 PATCH, EXTENDED RELEASE TRANSDERMAL at 07:07

## 2023-03-29 RX ADMIN — KETAMINE HYDROCHLORIDE 10 MG: 100 INJECTION, SOLUTION, CONCENTRATE INTRAMUSCULAR; INTRAVENOUS at 10:24

## 2023-03-29 RX ADMIN — FENTANYL CITRATE 50 MCG: 50 INJECTION INTRAMUSCULAR; INTRAVENOUS at 09:53

## 2023-03-29 RX ADMIN — FENTANYL CITRATE 50 MCG: 50 INJECTION INTRAMUSCULAR; INTRAVENOUS at 09:29

## 2023-03-29 RX ADMIN — ESMOLOL HYDROCHLORIDE 20 MG: 100 INJECTION, SOLUTION INTRAVENOUS at 10:14

## 2023-03-29 RX ADMIN — CEFAZOLIN SODIUM 2000 MG: 2 SOLUTION INTRAVENOUS at 08:39

## 2023-03-29 RX ADMIN — DEXMEDETOMIDINE HCL 8 MCG: 100 INJECTION INTRAVENOUS at 09:41

## 2023-03-29 RX ADMIN — MIDAZOLAM 2 MG: 1 INJECTION INTRAMUSCULAR; INTRAVENOUS at 08:39

## 2023-03-29 RX ADMIN — KETAMINE HYDROCHLORIDE 10 MG: 100 INJECTION, SOLUTION, CONCENTRATE INTRAMUSCULAR; INTRAVENOUS at 11:15

## 2023-03-29 RX ADMIN — FENTANYL CITRATE 25 MCG: 50 INJECTION, SOLUTION INTRAMUSCULAR; INTRAVENOUS at 12:41

## 2023-03-29 RX ADMIN — DEXMEDETOMIDINE HCL 8 MCG: 100 INJECTION INTRAVENOUS at 09:15

## 2023-03-29 RX ADMIN — ONDANSETRON 8 MG: 2 INJECTION INTRAMUSCULAR; INTRAVENOUS at 11:00

## 2023-03-29 RX ADMIN — DEXMEDETOMIDINE HCL 8 MCG: 100 INJECTION INTRAVENOUS at 09:26

## 2023-03-29 RX ADMIN — KETAMINE HYDROCHLORIDE 10 MG: 100 INJECTION, SOLUTION, CONCENTRATE INTRAMUSCULAR; INTRAVENOUS at 10:50

## 2023-03-29 RX ADMIN — DEXMEDETOMIDINE HCL 8 MCG: 100 INJECTION INTRAVENOUS at 08:50

## 2023-03-29 RX ADMIN — KETAMINE HYDROCHLORIDE 20 MG: 100 INJECTION, SOLUTION, CONCENTRATE INTRAMUSCULAR; INTRAVENOUS at 10:16

## 2023-03-29 RX ADMIN — FENTANYL CITRATE 25 MCG: 50 INJECTION, SOLUTION INTRAMUSCULAR; INTRAVENOUS at 12:26

## 2023-03-29 RX ADMIN — DEXMEDETOMIDINE HCL 4 MCG: 100 INJECTION INTRAVENOUS at 10:04

## 2023-03-29 RX ADMIN — FENTANYL CITRATE 50 MCG: 50 INJECTION INTRAMUSCULAR; INTRAVENOUS at 08:51

## 2023-03-29 RX ADMIN — FENTANYL CITRATE 50 MCG: 50 INJECTION INTRAMUSCULAR; INTRAVENOUS at 10:54

## 2023-03-29 RX ADMIN — DEXAMETHASONE SODIUM PHOSPHATE 10 MG: 10 INJECTION INTRAMUSCULAR; INTRAVENOUS at 08:52

## 2023-03-29 RX ADMIN — SODIUM CHLORIDE, SODIUM LACTATE, POTASSIUM CHLORIDE, AND CALCIUM CHLORIDE: .6; .31; .03; .02 INJECTION, SOLUTION INTRAVENOUS at 10:50

## 2023-03-29 RX ADMIN — FENTANYL CITRATE 50 MCG: 50 INJECTION INTRAMUSCULAR; INTRAVENOUS at 10:34

## 2023-03-29 RX ADMIN — CEFAZOLIN SODIUM 1000 MG: 1 INJECTION, POWDER, FOR SOLUTION INTRAMUSCULAR; INTRAVENOUS at 09:12

## 2023-03-29 RX ADMIN — SODIUM CHLORIDE 125 ML/HR: 0.9 INJECTION, SOLUTION INTRAVENOUS at 07:33

## 2023-03-29 NOTE — INTERVAL H&P NOTE
H&P reviewed  After examining the patient I find no changes in the patients condition since the H&P had been written      Vitals:    03/29/23 0734   BP: 115/72   Pulse: 80   Resp: 20   Temp: 97 6 °F (36 4 °C)   SpO2: 95%

## 2023-03-29 NOTE — ANESTHESIA PREPROCEDURE EVALUATION
Procedure:  REMOVAL TUMOR BONE (Left: Foot)    Relevant Problems   ANESTHESIA   (+) PONV (postoperative nausea and vomiting)      GI/HEPATIC   (+) Gastroesophageal reflux disease      NEURO/PSYCH   (+) Anxiety   (+) Chronic pain of left ankle   (+) Generalized anxiety disorder with panic attacks      PULMONARY   (+) Asthma   (+) Extrinsic asthma without status asthmaticus      Other   (+) Morbid obesity with BMI of 40 0-44 9, adult [facility])        Physical Exam    Airway    Mallampati score: I  TM Distance: >3 FB  Neck ROM: full     Dental   No notable dental hx     Cardiovascular  Rhythm: regular, Rate: normal, Cardiovascular exam normal    Pulmonary  Pulmonary exam normal     Other Findings        Anesthesia Plan  ASA Score- 3     Anesthesia Type- general with ASA Monitors  Additional Monitors:   Airway Plan: LMA  Comment: Scope patch ordered  Plan Factors-Exercise tolerance (METS): >4 METS  Chart reviewed  Existing labs reviewed  Patient summary reviewed  Induction- intravenous  Postoperative Plan-     Informed Consent- Anesthetic plan and risks discussed with patient  no

## 2023-03-29 NOTE — DISCHARGE INSTR - AVS FIRST PAGE
POSTOPERATIVE INSTRUCTIONS     MEDICATIONS:  Resume all home medications unless otherwise instructed by your surgeon  Pain Medication:  Tramadol  If you were given a regional anesthetic (nerve block), please begin taking the pain medication as soon as you get home, even if you have minimal or no pain  DO NOT WAIT FOR THE NERVE BLOCK TO WEAR OFF  Possible side effects include nausea, constipation, and urinary retention  If you experience these side effects, please call our office for assistance  Pain med refills are authorized only during office hours (8am-4pm, Mon-Fri)  Anti-Inflammatory:  Tylenol and Naproxen  Take with food  Stop if you experience nausea, reflux, or stomach pain  Nausea Medication:  Zofran ODT 4 mg, 1 tablet every 6 hours as needed  Fill prescription ONLY if you expericnce severe nausea  Blood Clot Prevention:  Aspirin 81 mg, 1 tablet twice daily for 2 weeks  Pump your foot up and down 20 times per hour while you are less mobile  WOUND CARE:  Keep the dressing clean and dry  Light drainage may occur the first 2 days postop  DO NOT REMOVE THE DRESSINGS until you are seen in our office  Cover the bandages appropriately while washing to keep them from getting wet  Please call our office ([phone]) if you experience either of the following:  Sudden increase in swelling, redness, or warmth at the surgical site  Excessive incisional drainage that persists beyond the 3rd day after surgery  Oral temperature greater than 101 degrees, not relieved with Tylenol  Shortness of breath, chest pain, nausea, or any other concerning symptoms    SWELLING CONTROL:  Cold Therapy: The cold therapy device may be used either continuously or only as needed, according to your preference  Do not let the pad directly touch your skin  Alternatively, apply ice (20 min on, 20 min off) as often as you feel is necessary  Elevation:  Elevate the entire leg above heart level    Place pillows under your ankle "to keep your knee straight  Compression:  Apply ACE wraps or a thigh-length compression stocking as needed  RANGE OF MOTION:  You are NOT ALLOWED RANGE OF MOTION until you are given permission from your surgeon  IMMOBILIZATION:  DO NOT REMOVE YOUR SPLINT  Keep it clean and dry  ACTIVITY:   DO NOT BEAR WEIGHT on the operative leg  Always use crutches  Using Crutches on Stairs:  Going up, lead with your \"good\" (nonoperative) leg  Going down, lead with your \"bad\" (operative) leg  Use a hand rail when available  Knee Extension:  Place a rolled towel or pillow under your ankle for 20-30 minutes 3-5 times per day  This will help to maintain full knee extension  Quad Sets:  Sit or lie with your knee straight  Tighten your quadriceps (front thigh) muscle  Hold for 3 seconds, then relax  Repeat 20 times per hour while awake  PHYSICAL THERAPY:  You will be given a physical therapy prescription when you are seen in the office for your postoperative appointment  FOLLOW-UP APPOINTMENT:  10-14 days after surgery with:    ANDRZEJ Huber  [facility]  [phone]      "

## 2023-03-29 NOTE — OP NOTE
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]   Pathologic fracture left talus    Procedure(s):  Left - REMOVAL TALUS BONE TUMOR  1  Radical resection of giant cell tumor of bone left talus, with high-speed mauri extending to margin to normal bleeding bone and with adjuvant argon beam, equivalent of a radical resection  2  Open reduction internal fixation pathologic fracture left talus with cannulated screw and cement    Specimen(s):  ID Type Source Tests Collected by Time Destination   1 : Left Talus bone tumor Tissue Bone TISSUE EXAM Jo Coe DO 3/29/2023 8160        Estimated Blood Loss:   Minimal    Drains:  * No LDAs found *    Anesthesia Type:   Choice    Operative Indications:  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]    Patient is a 26-year-old female who was having recurrent ankle sprains of her left ankle  She was having continued pain in the left ankle, x-ray was taken demonstrating a lytic lesion within her talar head  MRI with and without contrast was performed as well as CAT scan of the foot which demonstrated a geographic well circumcised likely benign lesion of the talus  Interventional radiology was consulted for biopsy, biopsy returned demonstrating 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  She was indicated for resection of this tumor due to his highly aggressive nature and destructive nature with fixation of the large defect left behind with hardware and cement  The patient has failed non operative measures and has opted for surgical intervention   Risks and benefits of the treatment options and surgery were discussed in detail with the patient by   Elle Chavira  The risks of surgery including infection, bleeding, injury to nerves, injury to the vessels, excess scar tissue formation, risk of failure of the procedure, the possible need for further surgery, and potential risk of loss of limb and life, recurrence of tumor, failure fixation; after weighing all the treatment options available, the patient has opted for surgical intervention and informed consent was obtained  Operative Findings:  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, pathologic fracture left talus    Complications:   None    Procedure and Technique:    Patient was brought to the operating room where she transferred operating room table, she was intubated and sedated in standard manner  A bump was placed underneath the right hip in order to externally rotate the left lower extremity for frog-leg position  Tourniquet was placed around the left thigh, perioperative antibiotics were given, Ancef  Left lower extremities prepped and draped in standard sterile fashion  Timeout was performed identifying all team members in the room and the left ankle talus as the proper operative area    Esmarch tourniquet was wrapped the left lower extremity, tourniquet was inflated 300 mmHg  Approximately 10 cm incision was made ellipsing out the previous biopsy site on the medial side of the ankle just distal to the medial malleolus and tracking to possibly dissect out the posterior tibial neurovascular bundle  This is after 1% lidocaine with epinephrine was injected  Skin was sharply incised down to fascia, hemostasis was obtained skin flaps were made both medially and laterally  Biopsy site was completely resected down to the head of the talus en bloc  This was removed  Fascia was opened up proximally distally, capsule overlying the talar head was identified and gently dissected open in order to identify the medial portion of the talar head    Retractors were placed with Ray-Geovany's to protect the articular surface  X-rays performed demonstrating appropriate area of lytic lesion of talar head away from the navicular  Small curette was then placed into the medial portion of the talus, this was extremely weak and went directly into the bone  Sequential opening of the pathologic fracture within the talus was done using multiple sized curettes and a small rongeur  This was unroofed  Multiple angled curettes were then used in order to remove deep tumor  Tumor was removed and sent for permanent pathology  Vigorous curetting was performed in every ankle within the deep talus of multiple sized curettes in order to remove all possible visible disease  Bovie cautery was then used to cauterize the cavity and remove any deep tissue and possible remaining tissue in the nooks and crannies of the talus  High-speed bur was then used to smooth out any trabeculae and extend the resection margin beyond the diseased bone into normal bleeding bone the equivalent of a radical resection  This was done from all angles to ensure the maximum amount of resection and to decrease recurrence  Argon beam cautery was then used within the cavity circumferentially and every nook and crannie within the deep talus in order to add adjuvant treatment to decrease risk of recurrence  Cartilaginous surfaces were left intact  Cannulated guidewire was then placed lateral to the Achilles at the posterior surface of the talus this was checked on the fluoroscopic guidance to be within the proper area of the body of the talus this was driven into the central portion of the talus under fluoroscopic guidance and seem to come out from the normal bone into the previous area of the lesion  A large 5 o mm cannulated screw was then measured and placed within the cavity over the guidewire protecting the soft tissues of the posterior lateral portion of the ankle    This was checked under fluoroscopic guidance and deemed to be extra-articular and appropriately within the center of the talus to increase strength of fixation bridging the normal bone to the area of the lytic lesion  Hydroperoxide soaked vaginal packing was then placed deep within the cavity of the talus to remove any further debris  1 bag of cement was then mixed on the back table allowed to be dried into a putty like state and then packed deeply within the talar head surrounding the screw to act as rebar for the reconstruction of radical resection of the talus  This allowed to dry and mold, the navicular was packed onto the talus in order to allow hardening of the cement in the mold of the talus  There is no restrictions to motion within the ankle dorsiflexion eversion and inversion and plantarflexion  Images performed demonstrating cement packing within the lesion of the talus  Wound was copiously irrigated with normal saline, tourniquet was removed let down, hemostasis was obtained  Deep capsular tissue was closed with a #2 Ethibond  Deep fatty tissue was closed with a #1 Vicryl, subcutaneous tissue was closed with 2-0 Monocryl  3-0 Monocryl was run as a subcuticular stitch  Skin was cleansed and dried, Steri-Strips were placed, Mepilex dressing was placed    Patient was placed in a AO splint, and awoken from anesthesia without complication sent to the PACU     I was present for the entire procedure, A qualified resident physician was not available and A physician assistant, Raul Pappas,  was required during the procedure for retraction tissue handling,dissection and suturing    Patient Disposition:  PACU  and extubated and stable        SIGNATURE: Livier Bone DO  DATE: March 29, 2023  TIME: 11:20 AM

## 2023-03-29 NOTE — ANESTHESIA POSTPROCEDURE EVALUATION
Post-Op Assessment Note    CV Status:  Stable    Pain management: adequate     Mental Status:  Alert and awake   Hydration Status:  Euvolemic   PONV Controlled:  Controlled   Airway Patency:  Patent      Post Op Vitals Reviewed: Yes      Staff: Anesthesiologist         No notable events documented      BP      Temp      Pulse     Resp      SpO2      /81   Pulse 95   Temp 97 9 °F (36 6 °C) (Temporal)   Resp 16   Wt 126 kg (276 lb 14 4 oz)   LMP 03/01/2023 (Exact Date)   SpO2 96%   BMI 42 10 kg/m²

## 2023-03-30 NOTE — UTILIZATION REVIEW
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]   Pathologic fracture left talus     Procedure(s):  Left - REMOVAL TALUS BONE TUMOR  1  Radical resection of giant cell tumor of bone left talus, with high-speed mauri extending to margin to normal bleeding bone and with adjuvant argon beam, equivalent of a radical resection  2  Open reduction internal fixation pathologic fracture left talus with cannulated screw and cement    Anesthesia: General    Operative Findings: 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, pathologic fracture left talus    PT WAS D/C ON 3/29 - SAME DAY AS ADMISSION  HAD IP ORDER        Admission Orders: Date/Time/Statement:   Admission Orders (From admission, onward)     Ordered        03/29/23 1212  Inpatient Admission  Once                      Orders Placed This Encounter   Procedures   • Inpatient Admission     Standing Status:   Standing     Number of Occurrences:   1     Order Specific Question:   Level of Care     Answer:   Med Surg [16]     Order Specific Question:   Estimated length of stay     Answer:   Inpatient Only Surgery     Vital Signs: /81   Pulse 95   Temp 97 9 °F (36 6 °C) (Temporal)   Resp 16   Wt 126 kg (276 lb 14 4 oz)   LMP 03/01/2023 (Exact Date)   SpO2 96%   BMI 42 10 kg/m²     Pertinent Labs/Diagnostic Test Results:     Diet: NPO  Mobility: elevate extremity  DVT Prophylaxis: SCDs    Medications/Pain Control:     Scheduled Medications:  IV Cefazolin 2 GM IV x 1 @ 0839     Continuous IV Infusions:  IV NSS @ 125 ml/hr      PRN Meds:  meclizine, 25 mg, Oral, Q8H PRN  Tramadol 50 mg x 1 3/29      Network Utilization Review Department  ATTENTION: Please call with any questions or concerns to [phone] and carefully listen to the prompts so that you are directed to the right person  All voicemails are confidential   Arlin Rg all requests for admission clinical reviews, approved or denied determinations and any other requests to dedicated fax number below belonging to the campus where the patient is receiving treatment   List of dedicated fax numbers for the Facilities:  [address] DENIALS (Administrative/Medical Necessity) [phone]   [address] (Maternity/NICU/Pediatrics) [phone] [address] [phone]   [facility]  [phone]   [address] [address] Armen Dominguez [facility] 28 [phone] 1550 [facility] [place] [address] [facility] 134 815 [facility] [phone]

## 2023-03-31 NOTE — UTILIZATION REVIEW
NOTIFICATION OF ADMISSION DISCHARGE   This is a Notification of Discharge from [facility]  Please be advised that this patient has been discharge from our facility  Below you will find the admission and discharge date and time including the patient’s disposition  UTILIZATION REVIEW CONTACT:  Jatin Andino  Utilization   Network Utilization Review Department  Phone: [phone] x carefully listen to the prompts  All voicemails are confidential   Email: [email or web address]     ADMISSION INFORMATION  PRESENTATION DATE: 3/29/2023  6:48 AM  OBERVATION ADMISSION DATE:   INPATIENT ADMISSION DATE: 3/29/23 12:12 PM   DISCHARGE DATE: 3/29/2023  3:10 PM   DISPOSITION:Home/Self Care    IMPORTANT INFORMATION:  Send all requests for admission clinical reviews, approved or denied determinations and any other requests to dedicated fax number below belonging to the campus where the patient is receiving treatment   List of dedicated fax numbers:  [address] DENIALS (Administrative/Medical Necessity) [phone]   [address] (Maternity/NICU/Pediatrics) [phone]   [facility] [phone]   [place] [address] [phone]   [address] [phone]   220 [facility] [phone]   90 [facility] [phone]   [facility] 119 [phone]   [facility]  [phone]   4057 [facility] [phone]   412 [facility] [address] [facility] [phone]

## 2023-04-04 ENCOUNTER — TELEPHONE (OUTPATIENT)
Dept: FAMILY MEDICINE CLINIC | Facility: CLINIC | Age: 28
End: 2023-04-04

## 2023-04-04 NOTE — TELEPHONE ENCOUNTER
Pt wanted to inform provider that she had ankle surgery, and all is well  Pt stated that she does not have any concerns, and if she needs to be seen by provider, she is willing to schedule an appt  Please advise

## 2023-04-04 NOTE — TELEPHONE ENCOUNTER
[place] at [facility] [place] and wanted to inform provider that pt was discharged after having ankle surgery  Requested to have discharge summary faxed to her @ fax [phone]  Contacted pt, and pt gaved permission to fax above mentioned documents      Zulema can be contacted @ [phone] ext [phone]

## 2023-05-02 ENCOUNTER — OFFICE VISIT (OUTPATIENT)
Dept: OBGYN CLINIC | Facility: CLINIC | Age: 28
End: 2023-05-02

## 2023-05-02 ENCOUNTER — APPOINTMENT (OUTPATIENT)
Dept: RADIOLOGY | Facility: MEDICAL CENTER | Age: 28
End: 2023-05-02

## 2023-05-02 VITALS
HEART RATE: 88 BPM | DIASTOLIC BLOOD PRESSURE: 84 MMHG | SYSTOLIC BLOOD PRESSURE: 132 MMHG | HEIGHT: 68 IN | BODY MASS INDEX: 42.1 KG/M2

## 2023-05-02 DIAGNOSIS — D48.0 GIANT CELL TUMOR OF BONE: ICD-10-CM

## 2023-05-02 DIAGNOSIS — D48.0 GIANT CELL TUMOR OF BONE: Primary | ICD-10-CM

## 2023-05-02 NOTE — PROGRESS NOTES
"ORTHOPEDIC ONCOLOGY POST OPERATIVE NOTE    Patient: Una Boles   Age: 32 y o  Sex: female  Gender: female  Date of Visit: 05/02/23    DOS: 3/29/23  Procedure performed: Removal of tumor from bone- left talus  1  Radical resection of giant cell tumor of bone left talus, with high-speed mauri extending to margin to normal bleeding bone and with adjuvant argon beam, equivalent of a radical resection  2  Open reduction internal fixation pathologic fracture left talus with cannulated screw and cement     Impression/Plan: 32 y o  female with a history of recurrent ankle sprains 6 weeks s/p removal of tumor of left talus  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  1  S/p removal of tumor from left talus  · Wound Care   · OK to shower  · Dab dry with towel  · No soaking/bathing for another 1-2 weeks  · Pain control: Prn  · Continue ice packs/elevation  · Can continue compression with ACE wrap or compression stockings  · Physical therapy: to begin HEP, exercises given  · To begin formal PT  · Progress WBAT w/ guidance from physical therapy  Begin 25% with weight through heel  Progress 25% each as tolerated  · Sling/Immobilizer/Brace: CAM walker  · Completed DVT ppx  · Discussed swelling as related to numbness/tingling    Return in about 4 weeks (around 5/30/2023) for evaluation and repeat x-rays  2  Giant cell tumor of bone  -Definitve diagnosis discussed at length  - Follow up protocol: XR q4 months post op for 2 years (2025); then q6 months for 2 years (2027); then annually till year 8 (2033)         Subjective:  32 y o  female 5 weeks s/p Resection of tumor of left talus      Pain/Complaints: none   Numbness/weakness extremity: none   Physical Therapy Progress: N/A   DVT ppx: ASA 81 BID complete    Abx: N/A   Eating/Drinking improving  Bowel/Bladder:  WNL   Denies fever/chills, numbness/tingling, injury/trauma, night sweats    Physical Exam:  Height: 5' 8\" (172 7 cm)       Vitals: " 05/02/23 0809   BP: 132/84   Pulse: 88     Body mass index is 42 1 kg/m²  General: alert and oriented; well nourished/well developed; no apparent distress  Present with , Agnel  Extremity: left ankle with minimal swelling throughout  Dressing/Surgical site: suture line intact, no drainage or erythema of incision site,   Motor/Senstation: intact   Brisk cap refill  Calf: bilateral calves soft, nontender    Labs: N/A    Pathology:   Final Diagnosis   A  Bone, Left Talus Tumor, Resection:  - Consistent with giant cell tumor of bone  See note  - No evidence of malignant transformation  Radiology: intraoperative XR reviewed today    Xray of left foot taken on 05/02/2023 were reviewed and showed hardware intact with no signs of loosening and maintained anatomical alignment, no osteolysis, no evidence of talar neck fracture  FOLLOW UP: Return in about 4 weeks (around 5/30/2023) for evaluation and repeat x-rays       Scribe Attestation    I,:  Stacy Negro am acting as a scribe while in the presence of the attending physician :       I,:  Curt Meckel, DO personally performed the services described in this documentation    as scribed in my presence :

## 2023-05-04 ENCOUNTER — EVALUATION (OUTPATIENT)
Dept: PHYSICAL THERAPY | Facility: CLINIC | Age: 28
End: 2023-05-04

## 2023-05-04 DIAGNOSIS — D48.0 GIANT CELL TUMOR OF BONE: Primary | ICD-10-CM

## 2023-05-04 DIAGNOSIS — M25.572 ACUTE LEFT ANKLE PAIN: ICD-10-CM

## 2023-05-04 NOTE — PROGRESS NOTES
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 Ambulatory Referral to Physical Therapy      2  Acute left ankle pain  M25 572                      Assessment  Assessment details: Pt is a 32year old female who presents to OP PT s/p removal of talus bone secondary to giant cell tumor on 3/29/23 by Dr Ramiro Richardson  Pt has been recently placed in CAM boot and instructed in progressive WB with guidance of PT  Upon examination, patient presents with decreased eversion and DF ROM, decreased ankel strength, and WB intolerance  Due to her current impairments patient has difficulty with standing, ambulation and functioning at prior level of function  Pt would benefit from OP PT services in order to address current impairments and functional limitations  Thank you for your referral!    Impairments: abnormal gait, abnormal or restricted ROM, activity intolerance, impaired balance, impaired physical strength, lacks appropriate home exercise program, pain with function and weight-bearing intolerance    Goals  STG (to be met within 4 weeks):   1  Pt will report no more than 3/10 pain in R ankle while WB in order to tolerate more activity   2  Pt will improve R DF/PF ROM by at least 5* in order to improve gait quality   3  Pt will improve R inv/eversion ROM by at least 5* to be able to ambulate on uneven surfaces  4  Pt will improve R ankle girth by at least 4 cm in order to improve pain  5  Pt will be able to ambulate for at least 5 blocks with no increase in pain in order to improve ambulation tolerance  6  Pt will improve R ankle strength in all affected planes by at least 1/2 grade to improve static stability    LTG (to be met within 8 weeks):  1  Pt will report 0/10 pain in R ankle at rest and with activity to return to PLOF  2   Pt will restore WFL R ankle ROM to return to PLOF  3  Pt will restore WFL R ankle strength in order to ambulate on even and uneven surfaces safely  4  Pt will improve R ankle girth to unaffected side in order to return to PLOF   5  Pt to tolerate self selected ambulation distances in order to promote independence  6  Pt to meet FOTO discharge score and be independent with HEP to maximize QOL     Plan  Patient would benefit from: skilled physical therapy  Planned modality interventions: thermotherapy: hydrocollator packs and cryotherapy  Planned therapy interventions: joint mobilization, manual therapy, neuromuscular re-education, patient education, strengthening, stretching, therapeutic exercise, home exercise program and balance  Frequency: 1-2x/wk  Duration in weeks: 6  Treatment plan discussed with: patient        Subjective Evaluation    History of Present Illness  Mechanism of injury: Pt reports rolling her L ankle 2 months ago (very typical as she rolls her ankle chronically)  She was unable to push through her foot and thought she may have torn something  Pt was seen by ortho and x-rays taken showing a tumor of her L talus  She was placed in a post op cast, then transitioned to CAM boot  NWB for 4 weeks and recently cleared to WB adding 25% each week  Pt has been diligent with exercises provided by referring provider and is compliant with WB precautions at this time  Pt has a physical job requiring her to stand and ambulate on various surfaces  Pain  No pain reported  Current pain ratin  At best pain ratin  At worst pain ratin    Treatments  Current treatment: physical therapy  Patient Goals  Patient goals for therapy: increased strength, return to sport/leisure activities, independence with ADLs/IADLs, increased motion, improved balance, decreased pain and decreased edema          Objective     Observations     Additional Observation Details  Healing incision medially on L foot   No drainage or redness noted    Palpation     Additional Palpation Details  (-) tenderness    Tenderness   Left Ankle/Foot   No tenderness  Neurological Testing     Sensation     Ankle/Foot   Left Ankle/Foot   Intact: light touch    Right Ankle/Foot   Intact: light touch     Active Range of Motion   Left Ankle/Foot   Dorsiflexion (ke): 0 degrees   Plantar flexion: WFL  Inversion: WFL  Eversion: 0 degrees     Joint Play   Left Ankle/Foot  Joints within functional limits are the midfoot and forefoot  Hypomobile in the talocrural joint and subtalar joint       Strength/Myotome Testing     Left Ankle/Foot   Dorsiflexion: 4-  Plantar flexion: 4-  Inversion: 4-  Eversion: 4-    Swelling   Left Ankle/Foot   Figure 8: 55 cm      Flowsheet Rows    Flowsheet Row Most Recent Value   PT/OT G-Codes    Current Score 37   Projected Score 68             Precautions: DOS 3/29/23, 25% WB increase from 5/2  POC Expiration: 6/4/23  Manuals 5/4       PROM        Talocrural mobs        Subtalar mobs        Calf stretch                Neuro Re-Ed        Arch lifts        [facility] wipers        Toe scrunches        Toe Yoga        Tandem                                        TherEx        [address] [facility]        AROM        ABCs        Gastroc/soleus stretch        PF stretch/roll        TR/HR        Seated mini band ankle eversion in DF, PF                        Instructed HEP & education 10'                       Gait Training                                Modalities

## 2023-05-05 NOTE — PROGRESS NOTES
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       2  Acute left ankle pain  M25 572                      Subjective: Pt reports no new complaints, has been slowly progressing WB without issues and consistent with HEP      Objective: See treatment diary below      Assessment:  Pt tolerated treatment session well  Following WB precautions and adherence with CAM boot at all times  PT notes start of improvements with talocrural and subtalar mobility  STM specifically at medial incision and pos tib tendon  PT instructed pt in self scar mobilization  Overall progressing very well towards goals  Pt would benefit from continued OP PT services  Plan: Continue per plan of care        Precautions: DOS 3/29/23, 25% WB increase from   POC Expiration: 23  Manuals       Scar mobilization  5'      Talocrural mobs  5'      Subtalar mobs  5'      Calf stretch  5'              Neuro Re-Ed        Arch lifts        [facility] wipers        Toe scrunches        Toe Yoga        Tandem                                        TherEx        [address] [facility]  10' L2      AROM  DF/PF, inv/ev 10x ea      SS c squat  6x20'      Gastroc/soleus stretch        PF stretch/roll  3'      TR/HR  Seated 10# LLE only 2x10      Seated mini band ankle eversion in DF, PF  RTB 2x10 ea      Step fwd, lat  6\" 10x bilat ea              Instructed HEP & education 10' 5'                      Gait Training                                Modalities                      "

## 2023-05-08 ENCOUNTER — OFFICE VISIT (OUTPATIENT)
Dept: PHYSICAL THERAPY | Facility: CLINIC | Age: 28
End: 2023-05-08

## 2023-05-08 DIAGNOSIS — M25.572 ACUTE LEFT ANKLE PAIN: ICD-10-CM

## 2023-05-08 DIAGNOSIS — D48.0 GIANT CELL TUMOR OF BONE: Primary | ICD-10-CM

## 2023-05-15 ENCOUNTER — OFFICE VISIT (OUTPATIENT)
Dept: PHYSICAL THERAPY | Facility: CLINIC | Age: 28
End: 2023-05-15

## 2023-05-15 DIAGNOSIS — D48.0 GIANT CELL TUMOR OF BONE: Primary | ICD-10-CM

## 2023-05-15 DIAGNOSIS — M25.572 ACUTE LEFT ANKLE PAIN: ICD-10-CM

## 2023-05-15 NOTE — PROGRESS NOTES
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       2  Acute left ankle pain  M25 572                      Subjective: Pt states she was ambulating on an uneven surface when her foot got caught and had some pain after      Objective: See treatment diary below      Assessment: Treatment session modified due to initial symptoms and pt tolerated fairly well  Denies TTP at medial and lateral malleolus although pt does continue with tenderness over pos tib tendon  She does have some anterior pain when performing closed chain DF  Pt states since the weekend, her pain levels have been improving but she does has a low level pain of 2/10 at rest  Will continue to monitor symptoms and instructed pt to contact referring provider if symptoms persist or worsen  Pt would benefit from continued OP PT services  Plan: Continue per plan of care        Precautions: DOS 3/29/23, 25% WB increase from   POC Expiration: 23  Manuals 5/4 5/8 5/15     Scar mobilization  5' 5'     Talocrural mobs  5' 5'     Subtalar mobs  5' 5'     Calf stretch  5' 5'             Neuro Re-Ed        Arch lifts        [facility]        Toe scrunches        Toe Yoga        Tandem                                        TherEx        [address] [facility]  10' L2 10' L3     AROM  DF/PF, inv/ev 10x ea DF/PF, inv/ev 10x ea     SS c squat  6x20'      Gastroc/soleus stretch   /2 foam inv/ev 10x:05      PF stretch/roll  3' 3'     TR/HR  Seated 10# LLE only 2x10 Seated 10# LLE only 2x10     Seated mini band ankle eversion in DF, PF  RTB 2x10 ea RTB 2x10 ea     Step fwd, lat  6\" 10x bilat ea NT             Instructed HEP & education 10' 5' 5'                     Gait Training                                Modalities                      "

## 2023-05-17 ENCOUNTER — TELEPHONE (OUTPATIENT)
Dept: FAMILY MEDICINE CLINIC | Facility: CLINIC | Age: 28
End: 2023-05-17

## 2023-05-17 NOTE — TELEPHONE ENCOUNTER
Pt's  Jacquelyn Grover @ [address] called and stated that she is to be contacted for prior auth or other issues on behalf of pt      Tel: [phone] ext [phone]

## 2023-05-18 ENCOUNTER — OFFICE VISIT (OUTPATIENT)
Dept: OBGYN CLINIC | Facility: MEDICAL CENTER | Age: 28
End: 2023-05-18

## 2023-05-18 ENCOUNTER — APPOINTMENT (OUTPATIENT)
Dept: RADIOLOGY | Facility: MEDICAL CENTER | Age: 28
End: 2023-05-18

## 2023-05-18 VITALS — WEIGHT: 276 LBS | BODY MASS INDEX: 41.83 KG/M2 | HEIGHT: 68 IN

## 2023-05-18 DIAGNOSIS — M79.672 PAIN IN LEFT FOOT: Primary | ICD-10-CM

## 2023-05-18 DIAGNOSIS — M79.672 PAIN IN LEFT FOOT: ICD-10-CM
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
## 2023-05-22 ENCOUNTER — OFFICE VISIT (OUTPATIENT)
Dept: PHYSICAL THERAPY | Facility: CLINIC | Age: 28
End: 2023-05-22

## 2023-05-22 DIAGNOSIS — D48.0 GIANT CELL TUMOR OF BONE: Primary | ICD-10-CM

## 2023-05-22 DIAGNOSIS — M25.572 ACUTE LEFT ANKLE PAIN: ICD-10-CM

## 2023-05-22 NOTE — PROGRESS NOTES
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       2  Acute left ankle pain  M25 572                      Subjective: Pt reports great improvements in symptoms since last session      Objective: See treatment diary below      Assessment:  Pt tolerated treatment session fairly wel  Progressing program to challenge LE strength and dynamic balance without CAM boot  Denies pain during activity, typical post session soreness present  Pt would benefit from continued OP PT services  Plan: Continue per plan of care        Precautions: DOS 3/29/23, 25% WB increase from   POC Expiration: 23  Manuals 5/4 5/8 5/15 5/22    Scar mobilization  5' 5'     Talocrural mobs  5' 5' 5'    Subtalar mobs  5' 5' 5'    Calf stretch  5' 5' 5'            Neuro Re-Ed        BOSU squat    Drake wt ball 2x10    Slam ball    Semi tandem bilat 10x 4#    SL heel raise c foam    2x10                                                    TherEx        [address] [facility]  10' L2 10' L3 10' L3    AROM  DF/PF, inv/ev 10x ea DF/PF, inv/ev 10x ea     SS c squat  6x20'      Gastroc/soleus stretch   1/2 foam inv/ev 10x:05  1/2 foam inv/ev 10x:05    PF stretch/roll  3' 3'     TR/HR  Seated 10# LLE only 2x10 Seated 10# LLE only 2x10 Standing @ wall 2x10    Seated mini band ankle eversion in DF, PF  RTB 2x10 ea RTB 2x10 ea     Step fwd, lat  6\" 10x bilat ea NT             Instructed HEP & education 10' 5' 5'                     Gait Training                                Modalities                      "

## 2023-05-30 ENCOUNTER — EVALUATION (OUTPATIENT)
Dept: PHYSICAL THERAPY | Facility: CLINIC | Age: 28
End: 2023-05-30

## 2023-05-30 DIAGNOSIS — D48.0 GIANT CELL TUMOR OF BONE: Primary | ICD-10-CM

## 2023-05-30 DIAGNOSIS — M25.572 ACUTE LEFT ANKLE PAIN: ICD-10-CM

## 2023-05-30 NOTE — PROGRESS NOTES
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       2  Acute left ankle pain  M25 572                      Assessment  Assessment details: Pt has attended a total of 5 PT sessions and has made good progress towards goals  She has made progress regarding ankle ROM, strength, WB tolerance and has successfully transitioned out of CAM boot into sneaker  Pt continues with strength and proprioception deficits specifically with SL stance and dynamic surfaces  Her greatest limitation to returning to her PLOF at this time us continued ms endurance deficits, decreased ms strength and fair ankle proprioception (pt does have h/o of CINDY)  Pt would benefit from continued OP PT services in order to address remaining deficits and limitations  Thank you! Impairments: abnormal or restricted ROM, activity intolerance, impaired balance, impaired physical strength, lacks appropriate home exercise program and pain with function    Goals  STG (to be met within 4 weeks):   1  Pt will report no more than 3/10 pain in R ankle while WB in order to tolerate more activity   met  2  Pt will improve R DF/PF ROM by at least 5* in order to improve gait quality   met  3  Pt will improve R inv/eversion ROM by at least 5* to be able to ambulate on uneven surfaces  met  4  Pt will improve R ankle girth by at least 4 cm in order to improve pain  progressing  5  Pt will be able to ambulate for at least 5 blocks with no increase in pain in order to improve ambulation tolerance  met  6  Pt will improve R ankle strength in all affected planes by at least 1/2 grade to improve static stability  progressing    LTG (to be met within 8 weeks):  1  Pt will report 0/10 pain in R ankle at rest and with activity to return to PLOF  progressing  2   Pt will restore WFL R ankle ROM to return to PLOF progressing  3  Pt will restore WFL R ankle strength in order to ambulate on even and uneven surfaces safely  progressing  4  Pt will improve R ankle girth to unaffected side in order to return to PLOF   progressing  5  Pt to tolerate self selected ambulation distances in order to promote independence  progressing  6  Pt to meet FOTO discharge score and be independent with HEP to maximize QOL   progressing    Plan  Patient would benefit from: skilled physical therapy  Planned modality interventions: thermotherapy: hydrocollator packs and cryotherapy  Planned therapy interventions: joint mobilization, manual therapy, neuromuscular re-education, patient education, strengthening, stretching, therapeutic exercise, home exercise program and balance  Frequency: 1x week  Duration in weeks: 6  Treatment plan discussed with: patient        Subjective Evaluation    History of Present Illness  Mechanism of injury: Pt reports improvements in regards to pain, strength and overall function  She has transitioned out of boot into sneaker with minimal difficulty  Pt states her ankle continues to be unstable in medial/lateral directions   Pain  No pain reported  Current pain ratin  At best pain ratin  At worst pain ratin    Treatments  Current treatment: physical therapy  Patient Goals  Patient goals for therapy: increased strength, return to sport/leisure activities, independence with ADLs/IADLs, increased motion, improved balance, decreased pain and decreased edema          Objective     Observations     Additional Observation Details  Healing incision medially on L foot  No drainage or redness noted    Palpation     Additional Palpation Details  (-) tenderness    Tenderness   Left Ankle/Foot   Tenderness in the posterior tibial tendon       Neurological Testing     Sensation     Ankle/Foot   Left Ankle/Foot   Intact: light touch    Right Ankle/Foot   Intact: light touch     Active Range of Motion "  Left Ankle/Foot   Dorsiflexion (ke): 2 degrees   Plantar flexion: WFL  [facility]    Joint Play   Left Ankle/Foot  Joints within functional limits are the subtalar joint, midfoot and forefoot  Hypomobile in the talocrural joint       Strength/Myotome Testing     Left Ankle/Foot   Dorsiflexion: 4+  Plantar flexion: 5  Inversion: 4-  Eversion: 4-    Swelling   Left Ankle/Foot   Figure 8: 56 5 cm    Right Ankle/Foot   Figure 8: 55 5 cm             Precautions: DOS 3/29/23, CINDY  POC Expiration: 6/30/23  Manuals 5/4 5/8 5/15 5/22 5/30   Scar mobilization  5' 5'     Talocrural mobs  5' 5' 5' 5'   Subtalar mobs  5' 5' 5' 5'   Calf stretch  5' 5' 5' 5'           Neuro Re-Ed        BOSU squat    Drake wt ball 2x10 Drake wt ball 2x10   Slam ball    Semi tandem bilat 10x 4# Semi tandem bilat 10x 4#   SL heel raise c foam    2x10 At bars 2x10   BOSU static c ball toss     :30x3                                           TherEx        [address] [facility]  10' L2 10' L3 10' L3 10' L3   AROM  DF/PF, inv/ev 10x ea DF/PF, inv/ev 10x ea     SS c squat  6x20'      Gastroc/soleus stretch   1/2 foam inv/ev 10x:05  1/2 foam inv/ev 10x:05    PF stretch/roll  3' 3'     TR/HR  Seated 10# LLE only 2x10 Seated 10# LLE only 2x10 Standing @ wall 2x10 Standing @ wall 2x10   Seated mini band ankle eversion in DF, PF  RTB 2x10 ea RTB 2x10 ea     Step fwd, lat  6\" 10x bilat ea NT             Instructed HEP & education 10' 5' 5'                     Gait Training                                Modalities                        "

## 2023-06-02 NOTE — PROGRESS NOTES
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       2  Acute left ankle pain  M25 572                      Subjective: Pt reports continued improvements in symptoms and function; no new complaints at this time      Objective: See treatment diary below      Assessment:  Pt tolerated treatment session fairly well  Overall progress towards goals, denies pain with activity but contineus with ms endurance and proprioception deficits  Will f/u with referring provider tomorrow  PT notes restrictions in L pos tib along with B plantar fascia; subjective improvements post MT  Pt would benefit from continued OP PT services  Plan: Continue per plan of care        Precautions: DOS 3/29/23, CINDY  POC Expiration: 23  Manuals 6/5 5/8 5/15 5/22 5/30   Scar mobilization  5' 5'     Talocrural mobs 5' 5' 5' 5' 5'   Subtalar mobs 5' 5' 5' 5' 5'   Calf stretch [place]aston B feet 10' 5' 5' 5' 5'           Neuro Re-Ed        BOSU squat 6# 2x10   Drake wt ball 2x10 Drake wt ball 2x10   Slam ball Semi tandem bilat 10x 6#   Semi tandem bilat 10x 4# Semi tandem bilat 10x 4#   SL heel raise c foam @ table 15x   2x10 At bars 2x10   BOSU static c ball toss 1' side ea    :30x3   Wobble board ball toss 1' x22                                       TherEx        [facility] 10' L3 10' L2 10' L3 10' L3 10' L3   AROM  DF/PF, inv/ev 10x ea DF/PF, inv/ev 10x ea     SS c squat  6x20'      Gastroc/soleus stretch   1/2 foam inv/ev 10x:05  1/2 foam inv/ev 10x:05    PF stretch/roll  3' 3'     TR/HR Standing @ wall 2x10 Seated 10# LLE only 2x10 Seated 10# LLE only 2x10 Standing @ wall 2x10 Standing @ wall 2x10   Seated mini band ankle eversion in DF, PF  RTB 2x10 ea RTB 2x10 ea     Step fwd, lat  6\" 10x bilat ea NT             Instructed HEP & education  5' 5'                     Gait " Training                                Modalities

## 2023-06-05 ENCOUNTER — OFFICE VISIT (OUTPATIENT)
Dept: PHYSICAL THERAPY | Facility: CLINIC | Age: 28
End: 2023-06-05
Payer: COMMERCIAL

## 2023-06-05 DIAGNOSIS — D48.0 GIANT CELL TUMOR OF BONE: Primary | ICD-10-CM

## 2023-06-05 DIAGNOSIS — M25.572 ACUTE LEFT ANKLE PAIN: ICD-10-CM

## 2023-06-05 PROCEDURE — 97110 THERAPEUTIC EXERCISES: CPT

## 2023-06-05 PROCEDURE — 97112 NEUROMUSCULAR REEDUCATION: CPT

## 2023-06-05 PROCEDURE — 97140 MANUAL THERAPY 1/> REGIONS: CPT

## 2023-06-06 ENCOUNTER — OFFICE VISIT (OUTPATIENT)
Dept: OBGYN CLINIC | Facility: CLINIC | Age: 28
End: 2023-06-06

## 2023-06-06 VITALS
DIASTOLIC BLOOD PRESSURE: 82 MMHG | HEIGHT: 68 IN | BODY MASS INDEX: 42.28 KG/M2 | SYSTOLIC BLOOD PRESSURE: 134 MMHG | HEART RATE: 82 BPM | WEIGHT: 279 LBS

## 2023-06-06 DIAGNOSIS — D48.0 GIANT CELL TUMOR OF BONE: Primary | ICD-10-CM

## 2023-06-06 PROCEDURE — 99024 POSTOP FOLLOW-UP VISIT: CPT | Performed by: STUDENT IN AN ORGANIZED HEALTH CARE EDUCATION/TRAINING PROGRAM

## 2023-06-06 NOTE — PROGRESS NOTES
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, with high-speed mauri extending to margin to normal bleeding bone and with adjuvant argon beam, equivalent of a radical resection  2  Open reduction internal fixation pathologic fracture left talus with cannulated screw and cement     Impression/Plan: 32 y o  female with a history of recurrent ankle sprains 10 weeks s/p removal of tumor of left talus  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  1  S/p removal of tumor from left talus  · Wound Care   · OK to shower  · Dab dry with towel  · No soaking/bathing for another 1-2 weeks  · Pain control: PRN  · Continue ice packs/elevation  · Can continue compression with ACE wrap or compression stockings  · Physical therapy: to begin HEP, exercises given  · To begin formal PT  · WBAT of LLE  · Completed DVT ppx  · Discussed swelling as related to numbness/tingling    2  Giant cell tumor of bone  - Definitve diagnosis discussed at length  - Recurrence may warrant use of denosumab  - Follow up protocol: XR q4 months post op for 2 years (2025); then q6 months for 2 years (2027); then annually till year 8 (2033)    Follow-Up: 3 months with repeat xrays     Subjective:  32 y o  female 10 weeks s/p Resection of tumor of left talus      Pain/Complaints: no pain at rest   Numbness/weakness extremity: Within normal limits   Physical Therapy Progress: Improving   DVT ppx: ASA 81 BID completed   Abx: N/A   Eating/Drinking improving  Bowel/Bladder: WNL   Denies fever/chills, numbness/tingling, injury/trauma, night sweats    Recent ankle sprain sustained on 05/13/2023 is improving  She notes mild tenderness with active and resistive inversion exercises   She states that she has been "icing and elevating  Back to full weight-bearing as tolerated  Physical Exam:  Height: 5' 8\" (172 7 cm)  Weight - Scale: 127 kg (279 lb)  BMI (Calculated): 42 4  BSA (Calculated - m2): 2 35 sq meters       Vitals:    06/06/23 1530   BP: 134/82   Pulse: 82     Body mass index is 42 42 kg/m²  General: alert and oriented; well nourished/well developed; no apparent distress  Extremity: left ankle with minimal swelling throughout  Dressing/Surgical site: suture line intact, no drainage or erythema of incision site,   Motor/Senstation: intact   Brisk cap refill  Calf: bilateral calves soft, nontender    Labs: N/A    Pathology:   Final Diagnosis   A  Bone, Left Talus Tumor, Resection:  - Consistent with giant cell tumor of bone  See note  - No evidence of malignant transformation  Radiology: X-ray of left foot    Xray of left foot taken on 05/18/2023 were reviewed and showed hardware intact with no signs of loosening and maintained anatomical alignment, no osteolysis, no evidence of talar neck fracture       Scribe Attestation    I,:  Onofre Arrieta am acting as a scribe while in the presence of the attending physician :       I,:  Barbra Patricio, DO personally performed the services described in this documentation    as scribed in my presence :                "

## 2023-06-12 ENCOUNTER — OFFICE VISIT (OUTPATIENT)
Dept: PHYSICAL THERAPY | Facility: CLINIC | Age: 28
End: 2023-06-12
Payer: COMMERCIAL

## 2023-06-12 DIAGNOSIS — M25.572 ACUTE LEFT ANKLE PAIN: ICD-10-CM

## 2023-06-12 DIAGNOSIS — D48.0 GIANT CELL TUMOR OF BONE: Primary | ICD-10-CM

## 2023-06-12 PROCEDURE — 97110 THERAPEUTIC EXERCISES: CPT

## 2023-06-12 PROCEDURE — 97140 MANUAL THERAPY 1/> REGIONS: CPT

## 2023-06-12 PROCEDURE — 97112 NEUROMUSCULAR REEDUCATION: CPT

## 2023-06-12 NOTE — PROGRESS NOTES
Daily Note     Today's date: 2023  Patient name: Jay Soto  : 1995  MRN: [id number]  Referring provider: Naomy Morgan DO  Dx:   Encounter Diagnosis     ICD-10-CM    1  Giant cell tumor of bone  D48 0       2  Acute left ankle pain  M25 572                      Subjective: Pt reports continued improvements in symptoms and function; no new complaints at this time      Objective: See treatment diary below      Assessment:  Pt tolerated treatment session well  Progressing program to challenge LE strength and ankle balance/proprioception  Reports some discomfort initially with SL on BOSU, adjusted with good response  Pt would benefit from continued OP PT services  Plan: Continue per plan of care        Precautions: DOS 3/29/23, CINDY  POC Expiration: 23  Manuals 6/5 6/12 5/15 5/22 5/30   Scar mobilization   5'     Talocrural mobs 5' 5' 5' 5' 5'   Subtalar mobs 5' 5' 5' 5' 5'   Calf stretch Graston B feet 10' Graston B feet 5' 5' 5' 5'           Neuro Re-Ed        BOSU squat 6# 2x10 10# 2x10  Drake wt ball 2x10 Drake wt ball 2x10   Slam ball Semi tandem bilat 10x 6# Semi tandem bilat 10x 6#  Semi tandem bilat 10x 4# Semi tandem bilat 10x 4#   SL heel raise c foam @ table 15x @ bars 2x10  2x10 At bars 2x10   BOSU static c ball toss 1' side ea 1' side ea   :30x3   Wobble board ball toss 1' x2 1' x2      SL bosu squat  10x                              TherEx        [address] [facility] 10' L3 10' L3 10' L3 10' L3 10' L3   AROM   DF/PF, inv/ev 10x ea     SS c squat        Gastroc/soleus stretch   1/2 foam inv/ev 10x:05  1/2 foam inv/ev 10x:05    PF stretch/roll   3'     TR/HR Standing @ wall 2x10 Standing @ wall 2x10 Seated 10# LLE only 2x10 Standing @ wall 2x10 Standing @ wall 2x10   Seated mini band ankle eversion in DF, PF   RTB 2x10 ea     Step fwd, lat   NT             Instructed HEP & education   5'                     Gait Training                                Modalities

## 2023-06-16 NOTE — PROGRESS NOTES
Daily Note     Today's date: 2023  Patient name: Paul Dunbar  : 1995  MRN: [id number]  Referring provider: Nataliia Harrison DO  Dx:   Encounter Diagnosis     ICD-10-CM    1  Acute left ankle pain  M25 572       2  Giant cell tumor of bone  D48 0                      Subjective:  Patient reports she was mulching her yard over the weekend with no increase in symptoms and ability to push off on the left foot and ankle with no increase in symptoms  Patient reports the front of the ankle is very stiff today but no increase in symptoms  Post session, the patient reports relief of tightness and increase ease with walking  Objective: See treatment diary below      Assessment: Tolerated treatment well  PT notes the patient is approaching therapy goals so PT will plan on transition to HEP next session  Plan: Potential discharge next visit       Precautions: DOS 3/29/23, CINDY  POC Expiration: 23  Manuals 6/5      Scar mobilization        Talocrural mobs 5' 5' 5 min      Subtalar mobs 5' 5' 5 min      Calf stretch Graston B feet 10' Graston B feet 5' Graston Left Achilles, calf and foot              Neuro Re-Ed        BOSU squat 6# 2x10 10# 2x10 2x10 10#      Slam ball Semi tandem bilat 10x 6# Semi tandem bilat 10x 6# Semi Tandem Bilat LE   10x Each   6# Ball      SL heel raise c foam @ table 15x @ bars 2x10 At Bars   2x10      BOSU static c ball toss 1' side ea 1' side ea 1 min S/S      Wobble board ball toss 1' x2 1' x2 1 min S/S      SL bosu squat  10x 10x Bilat LE      Front lunge with fore foot on foam and no heel drop    10x Bilat LE                      TherEx        [address] [facility] 10' L3 10' L3 10 min L3     AROM        SS c squat        Gastroc/soleus stretch        PF stretch/roll        TR/HR Standing @ wall 2x10 Standing @ wall 2x10 Stand at wall   2x10      Seated mini band ankle eversion in DF, PF        Step fwd, lat                Instructed HEP & education Gait Training                                Modalities

## 2023-06-19 ENCOUNTER — OFFICE VISIT (OUTPATIENT)
Dept: PHYSICAL THERAPY | Facility: CLINIC | Age: 28
End: 2023-06-19
Payer: COMMERCIAL

## 2023-06-19 DIAGNOSIS — M25.572 ACUTE LEFT ANKLE PAIN: Primary | ICD-10-CM

## 2023-06-19 DIAGNOSIS — D48.0 GIANT CELL TUMOR OF BONE: ICD-10-CM

## 2023-06-19 PROCEDURE — 97140 MANUAL THERAPY 1/> REGIONS: CPT | Performed by: PHYSICAL THERAPIST

## 2023-06-19 PROCEDURE — 97110 THERAPEUTIC EXERCISES: CPT | Performed by: PHYSICAL THERAPIST

## 2023-06-19 PROCEDURE — 97112 NEUROMUSCULAR REEDUCATION: CPT | Performed by: PHYSICAL THERAPIST

## 2023-06-26 ENCOUNTER — OFFICE VISIT (OUTPATIENT)
Dept: PHYSICAL THERAPY | Facility: CLINIC | Age: 28
End: 2023-06-26
Payer: COMMERCIAL

## 2023-06-26 DIAGNOSIS — M25.572 ACUTE LEFT ANKLE PAIN: ICD-10-CM

## 2023-06-26 DIAGNOSIS — D48.0 GIANT CELL TUMOR OF BONE: Primary | ICD-10-CM

## 2023-06-26 PROCEDURE — 97140 MANUAL THERAPY 1/> REGIONS: CPT

## 2023-06-26 PROCEDURE — 97112 NEUROMUSCULAR REEDUCATION: CPT

## 2023-06-26 PROCEDURE — 97535 SELF CARE MNGMENT TRAINING: CPT

## 2023-06-26 NOTE — PROGRESS NOTES
PT Discharge    Today's date: 2023  Patient name: Emy Chu  : 1995  MRN: [id number]  Referring provider: Isabelle Hahn DO  Dx:   Encounter Diagnosis     ICD-10-CM    1  Giant cell tumor of bone  D48 0       2  Acute left ankle pain  M25 572                      Assessment  Assessment details: Pt has attended a total of 9 PT sessions and has made adequate progress towards goals to be d/c from PT services  PT reviewed and instructed HEP (handout provided) along with answering pt questions regarding current functional status  Pt has no concerns at time of discharge  Thank you! Goals  STG (to be met within 4 weeks):   1  Pt will report no more than 3/10 pain in R ankle while WB in order to tolerate more activity   met  2  Pt will improve R DF/PF ROM by at least 5* in order to improve gait quality   met  3  Pt will improve R inv/eversion ROM by at least 5* to be able to ambulate on uneven surfaces  met  4  Pt will improve R ankle girth by at least 4 cm in order to improve pain  progressing  5  Pt will be able to ambulate for at least 5 blocks with no increase in pain in order to improve ambulation tolerance  met  6  Pt will improve R ankle strength in all affected planes by at least 1/2 grade to improve static stability  met    LTG (to be met within 8 weeks):  1  Pt will report 0/10 pain in R ankle at rest and with activity to return to PLOF  met  2  Pt will restore WFL R ankle ROM to return to PLOF met  3  Pt will restore WFL R ankle strength in order to ambulate on even and uneven surfaces safely  met  4  Pt will improve R ankle girth to unaffected side in order to return to PLOF   progressing  5  Pt to tolerate self selected ambulation distances in order to promote independence  met  6   Pt to meet FOTO discharge score and be independent with HEP to maximize QOL   met        Subjective Evaluation    History of Present Illness  Mechanism of injury: Pt reports no major functional limitations and is pleased with her progress  Prepared to transition to HEP  Pain  No pain reported  Current pain ratin  At best pain ratin  At worst pain ratin          Objective     Observations     Additional Observation Details  Healing incision medially on L foot  No drainage or redness noted    Palpation     Additional Palpation Details  (-) tenderness    Neurological Testing     Sensation     Ankle/Foot   Left Ankle/Foot   Intact: light touch    Right Ankle/Foot   Intact: light touch     Active Range of Motion   Left Ankle/Foot   Dorsiflexion (ke): 4 degrees   Plantar flexion: WFL  Inversion: WFL    Right Ankle/Foot   Dorsiflexion (ke): 6 degrees     Joint Play   Left Ankle/Foot  Joints within functional limits are the talocrural joint, subtalar joint, midfoot and forefoot  Strength/Myotome Testing     Left Ankle/Foot   Dorsiflexion: 5  Plantar flexion: 5  Inversion: 4+  Eversion: 4+    Swelling   Left Ankle/Foot   Figure 8: 54 cm    Right Ankle/Foot   Figure 8: 53 cm             Precautions: DOS 3/29/23, CINDY  POC Expiration: 23  Manuals     Scar mobilization        Talocrural mobs 5' 5' 5 min  5'    Subtalar mobs 5' 5' 5 min  5'    Calf stretch Graston B feet 10' Graston B feet 5' Graston Left Achilles, calf and foot              Neuro Re-Ed        BOSU squat 6# 2x10 10# 2x10 2x10 10#      Slam ball Semi tandem bilat 10x 6# Semi tandem bilat 10x 6# Semi Tandem Bilat LE   10x Each   6# Ball  HR toes in, toes out 2x10 bilat ea    SL heel raise c foam @ table 15x @ bars 2x10 At Bars   2x10  At Bars   2x10     BOSU static c ball toss 1' side ea 1' side ea 1 min S/S      Wobble board ball toss 1' x2 1' x2 1 min S/S      SL bosu squat  10x 10x Bilat LE      Front lunge with fore foot on foam and no heel drop    10x Bilat LE  Fwd   Lat lunge 2x10 bilat ea                    TherEx        [facility] 10' L3 10' L3 10 min L3 10 min L3    AROM        SS c squat        Gastroc/soleus stretch    :15x3 bilat    PF stretch/roll        TR/HR Standing @ wall 2x10 Standing @ wall 2x10 Stand at wall   2x10      Seated mini band ankle eversion in DF, PF        Step fwd, lat                Instructed HEP & education    15'                    Gait Training                                Modalities

## 2023-07-06 ENCOUNTER — LAB (OUTPATIENT)
Dept: LAB | Facility: CLINIC | Age: 28
End: 2023-07-06
Payer: COMMERCIAL

## 2023-07-06 ENCOUNTER — OFFICE VISIT (OUTPATIENT)
Dept: FAMILY MEDICINE CLINIC | Facility: CLINIC | Age: 28
End: 2023-07-06
Payer: COMMERCIAL

## 2023-07-06 VITALS
OXYGEN SATURATION: 96 % | HEIGHT: 68 IN | DIASTOLIC BLOOD PRESSURE: 74 MMHG | HEART RATE: 84 BPM | WEIGHT: 276.6 LBS | SYSTOLIC BLOOD PRESSURE: 121 MMHG | BODY MASS INDEX: 41.92 KG/M2

## 2023-07-06 DIAGNOSIS — F41.1 GENERALIZED ANXIETY DISORDER WITH PANIC ATTACKS: ICD-10-CM

## 2023-07-06 DIAGNOSIS — R42 DIZZINESS: ICD-10-CM

## 2023-07-06 DIAGNOSIS — Z00.00 ANNUAL PHYSICAL EXAM: Primary | ICD-10-CM

## 2023-07-06 DIAGNOSIS — F41.0 GENERALIZED ANXIETY DISORDER WITH PANIC ATTACKS: ICD-10-CM

## 2023-07-06 DIAGNOSIS — Z13.220 LIPID SCREENING: ICD-10-CM

## 2023-07-06 LAB
ALBUMIN SERPL BCP-MCNC: 3.9 G/DL (ref 3.5–5)
ALP SERPL-CCNC: 129 U/L (ref 46–116)
ALT SERPL W P-5'-P-CCNC: 33 U/L (ref 12–78)
ANION GAP SERPL CALCULATED.3IONS-SCNC: 4 MMOL/L
AST SERPL W P-5'-P-CCNC: 19 U/L (ref 5–45)
BASOPHILS # BLD AUTO: 0.05 THOUSANDS/ÂΜL (ref 0–0.1)
BASOPHILS NFR BLD AUTO: 1 % (ref 0–1)
BILIRUB SERPL-MCNC: 0.43 MG/DL (ref 0.2–1)
BUN SERPL-MCNC: 10 MG/DL (ref 5–25)
CALCIUM SERPL-MCNC: 9.3 MG/DL (ref 8.3–10.1)
CHLORIDE SERPL-SCNC: 110 MMOL/L (ref 96–108)
CHOLEST SERPL-MCNC: 193 MG/DL
CO2 SERPL-SCNC: 27 MMOL/L (ref 21–32)
CREAT SERPL-MCNC: 0.78 MG/DL (ref 0.6–1.3)
EOSINOPHIL # BLD AUTO: 0.12 THOUSAND/ÂΜL (ref 0–0.61)
EOSINOPHIL NFR BLD AUTO: 2 % (ref 0–6)
ERYTHROCYTE [DISTWIDTH] IN BLOOD BY AUTOMATED COUNT: 12.3 % (ref 11.6–15.1)
GFR SERPL CREATININE-BSD FRML MDRD: 104 ML/MIN/1.73SQ M
GLUCOSE P FAST SERPL-MCNC: 107 MG/DL (ref 65–99)
HCT VFR BLD AUTO: 45.7 % (ref 34.8–46.1)
HDLC SERPL-MCNC: 43 MG/DL
HGB BLD-MCNC: 14.7 G/DL (ref 11.5–15.4)
IMM GRANULOCYTES # BLD AUTO: 0.02 THOUSAND/UL (ref 0–0.2)
IMM GRANULOCYTES NFR BLD AUTO: 0 % (ref 0–2)
LDLC SERPL CALC-MCNC: 122 MG/DL (ref 0–100)
LYMPHOCYTES # BLD AUTO: 2.61 THOUSANDS/ÂΜL (ref 0.6–4.47)
LYMPHOCYTES NFR BLD AUTO: 37 % (ref 14–44)
MCH RBC QN AUTO: 27.4 PG (ref 26.8–34.3)
MCHC RBC AUTO-ENTMCNC: 32.2 G/DL (ref 31.4–37.4)
MCV RBC AUTO: 85 FL (ref 82–98)
MONOCYTES # BLD AUTO: 0.58 THOUSAND/ÂΜL (ref 0.17–1.22)
MONOCYTES NFR BLD AUTO: 8 % (ref 4–12)
NEUTROPHILS # BLD AUTO: 3.77 THOUSANDS/ÂΜL (ref 1.85–7.62)
NEUTS SEG NFR BLD AUTO: 52 % (ref 43–75)
NONHDLC SERPL-MCNC: 150 MG/DL
NRBC BLD AUTO-RTO: 0 /100 WBCS
PLATELET # BLD AUTO: 419 THOUSANDS/UL (ref 149–390)
PMV BLD AUTO: 10.7 FL (ref 8.9–12.7)
POTASSIUM SERPL-SCNC: 4.5 MMOL/L (ref 3.5–5.3)
PROT SERPL-MCNC: 7.2 G/DL (ref 6.4–8.4)
RBC # BLD AUTO: 5.37 MILLION/UL (ref 3.81–5.12)
SODIUM SERPL-SCNC: 141 MMOL/L (ref 135–147)
TRIGL SERPL-MCNC: 140 MG/DL
TSH SERPL DL<=0.05 MIU/L-ACNC: 2.1 UIU/ML (ref 0.45–4.5)
WBC # BLD AUTO: 7.15 THOUSAND/UL (ref 4.31–10.16)

## 2023-07-06 PROCEDURE — 80061 LIPID PANEL: CPT

## 2023-07-06 PROCEDURE — 3725F SCREEN DEPRESSION PERFORMED: CPT | Performed by: FAMILY MEDICINE

## 2023-07-06 PROCEDURE — 85025 COMPLETE CBC W/AUTO DIFF WBC: CPT

## 2023-07-06 PROCEDURE — 84443 ASSAY THYROID STIM HORMONE: CPT

## 2023-07-06 PROCEDURE — 80053 COMPREHEN METABOLIC PANEL: CPT

## 2023-07-06 PROCEDURE — 36415 COLL VENOUS BLD VENIPUNCTURE: CPT

## 2023-07-06 PROCEDURE — 99395 PREV VISIT EST AGE 18-39: CPT | Performed by: FAMILY MEDICINE

## 2023-07-06 NOTE — PROGRESS NOTES
Assessment/Plan:       1. Annual physical exam  Assessment & Plan:  I have reviewed the nurse practitioner's note and agree with the workup and plan. 2. Lipid screening  -     Lipid panel; Future    3. Dizziness  -     CBC and differential; Future  -     Comprehensive metabolic panel; Future  -     TSH, 3rd generation with Free T4 reflex; Future        Subjective:      Patient ID: Orlando Bran is a 32 y.o. female. Arianna Blount is doing very well. Her meniere's/dizziness is improved but still present intermittently. We discussed diet/exercise/weight loss. meds reviewed. She does not smoke or use drugs. No alcohol use. She has a GYN. Labs are utd. The following portions of the patient's history were reviewed and updated as appropriate: allergies, current medications, past family history, past medical history, past social history, past surgical history, and problem list.    Review of Systems   Respiratory: Negative. Cardiovascular: Negative. Gastrointestinal: Negative. All other systems reviewed and are negative. Objective:      /74 (BP Location: Left arm, Patient Position: Sitting, Cuff Size: Large)   Pulse 84   Ht 5' 8" (1.727 m)   Wt 125 kg (276 lb 9.6 oz)   SpO2 96%   BMI 42.06 kg/m²          Physical Exam  Vitals and nursing note reviewed. Constitutional:       Appearance: Normal appearance. HENT:      Head: Normocephalic and atraumatic. Nose: Nose normal.      Mouth/Throat:      Mouth: Mucous membranes are moist.   Eyes:      Extraocular Movements: Extraocular movements intact. Pupils: Pupils are equal, round, and reactive to light. Cardiovascular:      Rate and Rhythm: Normal rate and regular rhythm. Pulses: Normal pulses. Pulmonary:      Effort: Pulmonary effort is normal.      Breath sounds: Normal breath sounds. Abdominal:      General: Bowel sounds are normal.      Palpations: Abdomen is soft.    Musculoskeletal:      Cervical back: Normal range of motion. Skin:     General: Skin is warm and dry. Capillary Refill: Capillary refill takes less than 2 seconds. Neurological:      General: No focal deficit present. Mental Status: She is alert.    Psychiatric:         Mood and Affect: Mood normal.

## 2023-07-10 RX ORDER — ALPRAZOLAM 0.25 MG/1
0.25 TABLET ORAL 3 TIMES DAILY PRN
Qty: 20 TABLET | Refills: 1 | Status: SHIPPED | OUTPATIENT
Start: 2023-07-10

## 2023-09-01 ENCOUNTER — APPOINTMENT (OUTPATIENT)
Dept: RADIOLOGY | Facility: MEDICAL CENTER | Age: 28
End: 2023-09-01
Payer: COMMERCIAL

## 2023-09-01 ENCOUNTER — OFFICE VISIT (OUTPATIENT)
Dept: OBGYN CLINIC | Facility: CLINIC | Age: 28
End: 2023-09-01
Payer: COMMERCIAL

## 2023-09-01 VITALS — WEIGHT: 280 LBS | BODY MASS INDEX: 42.44 KG/M2 | HEIGHT: 68 IN

## 2023-09-01 DIAGNOSIS — D48.0 GIANT CELL TUMOR OF BONE: Primary | ICD-10-CM

## 2023-09-01 DIAGNOSIS — D48.0 GIANT CELL TUMOR OF BONE: ICD-10-CM

## 2023-09-01 PROCEDURE — 73630 X-RAY EXAM OF FOOT: CPT

## 2023-09-01 PROCEDURE — 99214 OFFICE O/P EST MOD 30 MIN: CPT | Performed by: STUDENT IN AN ORGANIZED HEALTH CARE EDUCATION/TRAINING PROGRAM

## 2023-09-01 NOTE — PROGRESS NOTES
ORTHOPEDIC ONCOLOGY POST OPERATIVE NOTE    Patient: Lia Siddiqui   Age: 32 y.o. Sex: female  Gender: female  Date of Visit: 09/01/23    DOS: 3/29/23  Procedure performed: Removal of tumor from bone- left talus  1. 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 Open reduction internal fixation pathologic fracture left talus with cannulated screw and cement     Impression/Plan: 32 y.o. 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 1. S/p removal of tumor from left talus   -May wear lace up ankle brace with activity  -Pain control: PRN  ? Continue ice packs/elevation  ? Can continue compression with ACE wrap or compression stockings  -Completed DVT ppx    2. Giant cell tumor of bone  - Definitve diagnosis discussed at length. - Recurrence may warrant use of denosumab  - Follow up protocol: XR q4 months post op for 2 years (2025); then q6 months for 2 years (2027); then annually till year 8 (2033)    Follow-Up: 3 months with repeat xrays     Subjective:  32 y.o. female 10 weeks s/p Resection of tumor of left talus      Pain/Complaints: no pain at rest, mild pain WB   Numbness/weakness extremity: Within normal limits   Mild swelling   Physical Therapy Progress: Improving   DVT ppx: ASA 81 BID completed   Abx: N/A   Eating/Drinking improving. Bowel/Bladder: WNL   Denies fever/chills, numbness/tingling, injury/trauma, night sweats    The patient has rolled her ankle several times since the last visit, and also before the surgery, although she feels stable and has only mild pain weight bearing. She notes mild tenderness with active and resistive inversion exercises. She states that she has been icing and elevating. Full weight-bearing as tolerated.      Physical Exam:  Height: 5' 8" (172.7 cm)  Weight - Scale: 127 kg (280 lb)  BMI (Calculated): 42.6  BSA (Calculated - m2): 2.36 sq meters       There were no vitals filed for this visit. Body mass index is 42.57 kg/m². General: alert and oriented; well nourished/well developed; no apparent distress. Extremity: left ankle with minimal swelling throughout  Dressing/Surgical site: suture line intact, no drainage or erythema of incision site,   Motor/Senstation: intact   Brisk cap refill  Calf: bilateral calves soft, nontender    Labs: N/A    Pathology:   Final Diagnosis   A. Bone, Left Talus Tumor, Resection:  - Consistent with giant cell tumor of bone. See note. - No evidence of malignant transformation. Radiology: X-ray of left foot    Xray of left foot taken on 9/1/23 were reviewed and showed hardware intact with no signs of loosening and maintained anatomical alignment, no osteolysis, no evidence of talar neck fracture, no evidence of tumor recurrence.      Scribe Attestation    I,:  Zahra Hawthorne am acting as a scribe while in the presence of the attending physician.:       I,:  Zuleima Amor DO personally performed the services described in this documentation    as scribed in my presence.:

## 2023-09-05 ENCOUNTER — TELEPHONE (OUTPATIENT)
Dept: FAMILY MEDICINE CLINIC | Facility: CLINIC | Age: 28
End: 2023-09-05

## 2023-09-05 NOTE — TELEPHONE ENCOUNTER
Pt notified. Pt will restart medication, bud pt stated that she would like to wean off the medication safely.     Please advise

## 2023-09-05 NOTE — TELEPHONE ENCOUNTER
Pt stated that she had forgotten her medication at home when she went on a 2 week vacation. Pt stated that she was feeling fine, and decided to stay off the medication. Pt stated that her grandmother told her to call her provider since this is a medication that should not be stopped abruptly.     Please advise

## 2023-09-05 NOTE — TELEPHONE ENCOUNTER
Pt called and stated that she hasn't taken her citalopram(CeleXA) 40 mg tablets in about 1 month. Pt stated that she has been experiencing  mild symptoms of palpitations, which happens randomly, and last about 10 seconds. Pt also stated that her eyes has been lagging, and taking some time to get focused. Pt would like to know if you would like to see her in the office to discuss this, or what would you recommend that she do. Please advise.

## 2023-09-15 NOTE — PROGRESS NOTES
PT Evaluation     Today's date: 2023  Patient name: Juanjo Myers  : 1995  MRN: [id number]  Referring provider: Isabelle Bocanegra, PT  Dx:   Encounter Diagnosis     ICD-10-CM    1. Acute left ankle pain  M25.572                      Assessment  Assessment details: Pt is a 32year old female who presents to OP PT for acute pain of L juan with history of talus removal in 3/2023 due to a tumor. Upon examination, patient presents with decreased ankle and foot mobility, tenderness at ATFL, tenderness at navicular and antalgic gait. Due to her current impairments patient has difficulty with ambulation, uneven surfaces and functioning at prior level. Pt would benefit from OP PT services in order to address current impairments and functional limitations. Impairments: abnormal gait, abnormal or restricted ROM, activity intolerance, impaired balance, impaired physical strength, lacks appropriate home exercise program, pain with function and weight-bearing intolerance    Goals  STG (to be met within 4 weeks):   1. Pt will report no more than 3/10 pain in R ankle while WB in order to tolerate more activity   2. Pt will improve R DF/PF ROM by at least 5* in order to improve gait quality   3. Pt will be able to ambulate for at least 5 blocks with no increase in pain in order to improve ambulation tolerance  4. Pt will ascend hill with minimal increase in pain    LTG (to be met within 8 weeks):  1. Pt will report 0/10 pain in R ankle at rest and with activity to return to PLOF  2. Pt will restore WFL R ankle ROM to return to PLOF  3. Pt will ascend/descend hills without pain  4. Pt to tolerate self selected ambulation distances in order to promote independence  5.  Pt to meet FOTO discharge score and be independent with HEP to maximize QOL     Plan  Patient would benefit from: skilled physical therapy  Planned modality interventions: thermotherapy: hydrocollator packs and cryotherapy  Planned therapy interventions: joint mobilization, manual therapy, neuromuscular re-education, patient education, strengthening, stretching, therapeutic exercise, home exercise program and balance  Frequency: 1x week  Duration in weeks: 4  Treatment plan discussed with: patient        Subjective Evaluation    History of Present Illness  Mechanism of injury: Pt had undergone surgery on 3/29/23 for tumor removal of talus bone. She had subsequent OPPT and was d/c functioning at prior level. Since, she reports that her ankle has been doing fairly well until recently over the past few weeks she has noticed pain at the top of her foot/anterior ankle. This is worsened with extreme DF when going up hill, random episodes of ankle rolling and prolonged ambulation. Had f/u with ortho; no abnormal findings. Patient Goals  Patient goals for therapy: increased strength, return to sport/leisure activities, independence with ADLs/IADLs, increased motion, improved balance and decreased pain    Pain  Current pain ratin  At best pain ratin  At worst pain ratin  Location: Anterior ankle/top of foot  Quality: sharp  Aggravating factors: standing, walking, stair climbing and running    Treatments  Current treatment: physical therapy        Objective     Palpation     Additional Palpation Details  (-) tenderness    Tenderness   Left Ankle/Foot   Tenderness in the anterior talofibular ligament and navicular. Neurological Testing     Sensation     Ankle/Foot   Left Ankle/Foot   Intact: light touch    Right Ankle/Foot   Intact: light touch     Active Range of Motion   Left Ankle/Foot   Dorsiflexion (ke): -3 degrees   Plantar flexion: WFL  Inversion: [facility] [employer]    Joint Play   Left Ankle/Foot  Hypomobile in the talocrural joint, subtalar joint, midfoot and forefoot.      Strength/Myotome Testing     Left Ankle/Foot   Dorsiflexion: 4+  Plantar flexion: 4+  Inversion: 4+  Eversion: 4+             Precautions:   POC Expiration: 10/18/23  Manuals 9/18       PROM        Talocrural mobs        Subtalar mobs        Calf stretch                Neuro Re-Ed        Arch lifts        [facility] wipers        Toe scrunches        Toe Yoga        Tandem                                        TherEx        [address]        AROM        ABCs        Gastroc/soleus stretch        PF stretch/roll        TR/HR        Seated mini band ankle eversion in DF, PF                        Instructed HEP & education 10'                       Gait Training                                Modalities

## 2023-09-18 ENCOUNTER — OFFICE VISIT (OUTPATIENT)
Dept: PHYSICAL THERAPY | Facility: CLINIC | Age: 28
End: 2023-09-18
Payer: COMMERCIAL

## 2023-09-18 DIAGNOSIS — M25.572 ACUTE LEFT ANKLE PAIN: Primary | ICD-10-CM

## 2023-09-18 PROCEDURE — 97162 PT EVAL MOD COMPLEX 30 MIN: CPT

## 2023-09-18 PROCEDURE — 97535 SELF CARE MNGMENT TRAINING: CPT

## 2023-09-25 ENCOUNTER — OFFICE VISIT (OUTPATIENT)
Dept: PHYSICAL THERAPY | Facility: CLINIC | Age: 28
End: 2023-09-25
Payer: COMMERCIAL

## 2023-09-25 DIAGNOSIS — M25.572 ACUTE LEFT ANKLE PAIN: Primary | ICD-10-CM

## 2023-09-25 PROCEDURE — 97140 MANUAL THERAPY 1/> REGIONS: CPT

## 2023-09-25 PROCEDURE — 97112 NEUROMUSCULAR REEDUCATION: CPT

## 2023-09-25 PROCEDURE — 97110 THERAPEUTIC EXERCISES: CPT

## 2023-09-25 NOTE — PROGRESS NOTES
Daily Note     Today's date: 2023  Patient name: Denis Armstrong  : 1995  MRN: [id number]  Referring provider: Domenico Teran, PT  Dx:   Encounter Diagnosis     ICD-10-CM    1. Acute left ankle pain  M25.572                      Subjective: Pt reports she has been consistent with self DF mob, still deals with specific sharp pain at anterior ankle/navicular      Objective: See treatment diary below      Assessment:  Pt tolerated treatment session fairly well. Tenderness noted at lateral anterior ankle and continued hypomobility of talocrural joint. Pain at anterior joint with DF in closed wb positions under dynamic surface along with decreased PF power. Pt would benefit from continued OP PT services. Plan: Continue per plan of care.       Precautions:   POC Expiration: 10/18/23  Manuals       Graston anterior ankle/foot  5'      Talocrural mobs  10'      Subtalar mobs        Calf stretch                Neuro Re-Ed        BOSU SLR  10x bilat      BOSU squat  2x10      SL squat to table  On BOSU 2x10                                      TherEx        [address]  10' L3      Gastroc/soleus stretch        Ankle DF self mob   8" step :15x5      TR/HR        Seated mini band ankle eversion in DF, PF                        Instructed HEP & education 10'                       Gait Training                                Modalities

## 2023-10-01 NOTE — PROGRESS NOTES
Daily Note     Today's date: 10/2/2023  Patient name: Rodrigo Harding  : 1995  MRN: [id number]  Referring provider: Oumar Lobo DO  Dx:   Encounter Diagnosis     ICD-10-CM    1. Acute left ankle pain  M25.572                      Subjective: Pt reports soreness after last session lasting 2 days      Objective: See treatment diary below      Assessment:  Pt tolerated treatment session fairly well. Talocrural mobility improving and less pinching noted anteriorly. Continues with decreased SL power. Pt would benefit from continued OP PT services. Plan: Continue per plan of care.       Precautions:   POC Expiration: 10/18/23  Manuals 9/18 9/25 10/2     Graston anterior ankle/foot  5' 5'     Talocrural mobs  10' 10'     Subtalar mobs        Calf stretch                Neuro Re-Ed        BOSU SLR  10x bilat 10x bilat     BOSU squat  2x10 2x10     SL squat to table  On BOSU 2x10 On BOSU 2x10     Split squat jumps   2x10 bilat                             TherEx        [address]  10' L3 10' L3     Gastroc/soleus stretch        Ankle DF self mob   8" step :15x5 8" step :15x5     TR/HR        Seated mini band ankle eversion in DF, PF                        Instructed HEP & education 10'                       Gait Training                                Modalities

## 2023-10-02 ENCOUNTER — OFFICE VISIT (OUTPATIENT)
Dept: PHYSICAL THERAPY | Facility: CLINIC | Age: 28
End: 2023-10-02
Payer: COMMERCIAL

## 2023-10-02 DIAGNOSIS — M25.572 ACUTE LEFT ANKLE PAIN: Primary | ICD-10-CM

## 2023-10-02 PROCEDURE — 97110 THERAPEUTIC EXERCISES: CPT

## 2023-10-02 PROCEDURE — 97140 MANUAL THERAPY 1/> REGIONS: CPT

## 2023-10-02 PROCEDURE — 97112 NEUROMUSCULAR REEDUCATION: CPT

## 2023-10-06 ENCOUNTER — OFFICE VISIT (OUTPATIENT)
Dept: FAMILY MEDICINE CLINIC | Facility: CLINIC | Age: 28
End: 2023-10-06
Payer: COMMERCIAL

## 2023-10-06 VITALS
DIASTOLIC BLOOD PRESSURE: 81 MMHG | OXYGEN SATURATION: 100 % | HEART RATE: 90 BPM | HEIGHT: 68 IN | SYSTOLIC BLOOD PRESSURE: 143 MMHG | WEIGHT: 275.2 LBS | BODY MASS INDEX: 41.71 KG/M2

## 2023-10-06 DIAGNOSIS — R19.7 DIARRHEA, UNSPECIFIED TYPE: Primary | ICD-10-CM

## 2023-10-06 PROCEDURE — 99213 OFFICE O/P EST LOW 20 MIN: CPT | Performed by: FAMILY MEDICINE

## 2023-10-06 RX ORDER — MELATONIN
1000 DAILY
COMMUNITY

## 2023-10-06 RX ORDER — METFORMIN HYDROCHLORIDE 500 MG/1
500 TABLET, EXTENDED RELEASE ORAL 2 TIMES DAILY WITH MEALS
Qty: 60 TABLET | Refills: 1
Start: 2023-10-06

## 2023-10-06 RX ORDER — CHLORAL HYDRATE 500 MG
1000 CAPSULE ORAL DAILY
COMMUNITY

## 2023-10-06 NOTE — PROGRESS NOTES
Assessment/Plan:       1. Diarrhea, unspecified type  Comments:  due to metformin  switch to extended release lower dose  try probiotic  Orders:  -     metFORMIN (GLUCOPHAGE-XR) 500 mg 24 hr tablet; Take 1 tablet (500 mg total) by mouth 2 (two) times a day with meals          Subjective:      Patient ID: Shaji Lagunas is a 32 y.o. female. maye was started on metformin for PCOS and insulin resistance. She developed significant diarrhea once she got up to 1500mg a day. She has diarrhea every time she eats. She is off the medicine a few days now and still has diarrhea. She is not sick. The following portions of the patient's history were reviewed and updated as appropriate: allergies, current medications, past family history, past medical history, past social history, past surgical history, and problem list.    Review of Systems   Respiratory: Negative. Cardiovascular: Negative. Gastrointestinal: Negative. Objective:      /81 (BP Location: Left arm, Patient Position: Sitting, Cuff Size: Large)   Pulse 90   Ht 5' 8" (1.727 m)   Wt 125 kg (275 lb 3.2 oz)   SpO2 100%   BMI 41.84 kg/m²          Physical Exam  Vitals and nursing note reviewed. Constitutional:       Appearance: Normal appearance. HENT:      Head: Normocephalic and atraumatic. Nose: Nose normal.      Mouth/Throat:      Mouth: Mucous membranes are moist.   Eyes:      Extraocular Movements: Extraocular movements intact. Pupils: Pupils are equal, round, and reactive to light. Cardiovascular:      Rate and Rhythm: Normal rate and regular rhythm. Pulses: Normal pulses. Pulmonary:      Effort: Pulmonary effort is normal.      Breath sounds: Normal breath sounds. Abdominal:      General: Bowel sounds are normal.      Palpations: Abdomen is soft. Musculoskeletal:      Cervical back: Normal range of motion. Skin:     General: Skin is warm and dry.       Capillary Refill: Capillary refill takes less than 2 seconds. Neurological:      General: No focal deficit present. Mental Status: She is alert.    Psychiatric:         Mood and Affect: Mood normal.

## 2023-10-09 ENCOUNTER — OFFICE VISIT (OUTPATIENT)
Dept: PHYSICAL THERAPY | Facility: CLINIC | Age: 28
End: 2023-10-09
Payer: COMMERCIAL

## 2023-10-09 DIAGNOSIS — M25.572 ACUTE LEFT ANKLE PAIN: Primary | ICD-10-CM

## 2023-10-09 PROCEDURE — 97112 NEUROMUSCULAR REEDUCATION: CPT

## 2023-10-09 PROCEDURE — 97110 THERAPEUTIC EXERCISES: CPT

## 2023-10-09 PROCEDURE — 97140 MANUAL THERAPY 1/> REGIONS: CPT

## 2023-10-09 NOTE — PROGRESS NOTES
Daily Note     Today's date: 10/9/2023  Patient name: Shaji Lagunas  : 1995  MRN: [id number]  Referring provider: Henrry Donovan, PT  Dx:   Encounter Diagnosis     ICD-10-CM    1. Acute left ankle pain  M25.572                      Subjective: Pt reports her ankle/foot feels swollen and has more pain today than what she has been dealing with lately      Objective: See treatment diary below      Assessment:  Pt tolerated treatment session fairly well. PT notes increased swelling throuhgout ankle and foot today with increased tenderness at anterior ankle and navicular. Decreased power noted as well due to pain and swelling. Educated on importance of activity modification and cryotherapy in order to decrease symptoms. Pt would benefit from continued OP PT services. Plan: Continue per plan of care.       Precautions:   POC Expiration: 10/18/23  Manuals 9/18 9/25 10/2 10/9    Graston anterior ankle/foot  5' 5' 5'    Talocrural mobs  10' 10' 10'    Subtalar mobs        Calf stretch                Neuro Re-Ed        BOSU SLR  10x bilat 10x bilat 2x10 bilat    BOSU squat  2x10 2x10 2x10    SL squat to table  On BOSU 2x10 On BOSU 2x10 On BOSU 10x    Split squat jumps   2x10 bilat 10x bilat    Push pull cart    80# 4 laps    DL HR to SL eccentric lower    3x15            TherEx        [address]  10' L3 10' L3 10' L3    Gastroc/soleus stretch        Ankle DF self mob   8" step :15x5 8" step :15x5     TR/HR        Seated mini band ankle eversion in DF, PF                        Instructed HEP & education 10'                       Gait Training                                Modalities

## 2023-10-16 ENCOUNTER — OFFICE VISIT (OUTPATIENT)
Dept: PHYSICAL THERAPY | Facility: CLINIC | Age: 28
End: 2023-10-16
Payer: COMMERCIAL

## 2023-10-16 DIAGNOSIS — M25.572 ACUTE LEFT ANKLE PAIN: Primary | ICD-10-CM

## 2023-10-16 PROCEDURE — 97112 NEUROMUSCULAR REEDUCATION: CPT

## 2023-10-16 PROCEDURE — 97535 SELF CARE MNGMENT TRAINING: CPT

## 2023-10-16 PROCEDURE — 97140 MANUAL THERAPY 1/> REGIONS: CPT

## 2023-10-16 NOTE — PROGRESS NOTES
PT Discharge    Today's date: 10/16/2023  Patient name: Sarai Rothman  : 1995  MRN: [id number]  Referring provider: Blayne Ji, PT  Dx:   Encounter Diagnosis     ICD-10-CM    1. Acute left ankle pain  M25.572                      Assessment  Assessment details: Pt has attended a total of 5 PT sessions and has made adequate progress towards goals to be d/c from PT services. She reports improvements in pain levels along with function. Educated pt on importance of ice and activity modification when symptoms are increased. PT reviewed and instructed HEP (handout provided) along with answering pt questions regarding current functional status. Pt has no concerns at time of discharge. Thank you! Goals  STG (to be met within 4 weeks):   1. Pt will report no more than 3/10 pain in R ankle while WB in order to tolerate more activity   met  2. Pt will improve R DF/PF ROM by at least 5* in order to improve gait quality   met  3. Pt will be able to ambulate for at least 5 blocks with no increase in pain in order to improve ambulation tolerance  met  4. Pt will ascend hill with minimal increase in pain  met    LTG (to be met within 8 weeks):  1. Pt will report 0/10 pain in R ankle at rest and with activity to return to PLOF  met  2. Pt will restore WFL R ankle ROM to return to PLOF  met  3. Pt will ascend/descend hills without pain  met  4. Pt to tolerate self selected ambulation distances in order to promote independence  met  5. Pt to meet FOTO discharge score and be independent with HEP to maximize QOL   met        Subjective Evaluation    History of Present Illness  Mechanism of injury: Pt reports improvements since starting PT.  She notes improvements in pain levels and function    Pain  Current pain ratin  At best pain ratin  At worst pain ratin  Location: Anterior ankle/top of foot  Quality: dull ache          Objective     Palpation     Additional Palpation Details  (-) home tenderness    Tenderness   Left Ankle/Foot   No tenderness in the anterior talofibular ligament and navicular. Neurological Testing     Sensation     Ankle/Foot   Left Ankle/Foot   Intact: light touch    Right Ankle/Foot   Intact: light touch     Active Range of Motion   Left Ankle/Foot   Dorsiflexion (ke): 0 degrees   Plantar flexion: WFL  [facility]    Joint Play   Left Ankle/Foot  Joints within functional limits are the talocrural joint, subtalar joint and forefoot. Hypomobile in the midfoot.      Strength/Myotome Testing     Left Ankle/Foot   Dorsiflexion: 5  Plantar flexion: 4+  Inversion: 5  Eversion: 5             Precautions:   POC Expiration: 10/18/23  Manuals 9/18 9/25 10/2 10/9 10/16   [place] anterior ankle/foot  5' 5' 5' 5'   Talocrural mobs  10' 10' 10' 10'   Subtalar mobs        Calf stretch                Neuro Re-Ed        BOSU SLR  10x bilat 10x bilat 2x10 bilat 2x10 bilat   BOSU squat  2x10 2x10 2x10 2x10   SL squat to table  On BOSU 2x10 On BOSU 2x10 On BOSU 10x On BOSU 10x   Split squat jumps   2x10 bilat 10x bilat 10x bilat   Push pull cart    80# 4 laps    DL HR to SL eccentric lower    3x15 3x15           TherEx        [address]  10' L3 10' L3 10' L3 10' L3   Gastroc/soleus stretch        Ankle DF self mob   8" step :15x5 8" step :15x5     TR/HR        Seated mini band ankle eversion in DF, PF                        Instructed HEP & education 10'    10'                   Gait Training                                Modalities

## 2023-11-07 DIAGNOSIS — R73.01 IMPAIRED FASTING BLOOD SUGAR: Primary | ICD-10-CM

## 2023-11-08 ENCOUNTER — TELEPHONE (OUTPATIENT)
Dept: FAMILY MEDICINE CLINIC | Facility: CLINIC | Age: 28
End: 2023-11-08

## 2023-11-08 NOTE — TELEPHONE ENCOUNTER
Prior auth submitted via cover my meds portal( Key: [id number] ) for Ozempic with indication for PCOS, insulin resistance, and weight loss. Awaiting outcome. It is likely that insurance will deny auth due to not having a hx for a FDA approved indication of Type 2 DM. She does meet clinical criteria for [facility]MAMTA MCKEON or Saxenda since BMI >30. I encouraged her to reach out to local pharmacies to check for supply since these have been on a nationwide backorder. Patient does want to try to conceive in the future. Education patient that Moses Kiss, or Samantha Densonman would need to be discontinued at least 2 months prior to trying to conceive and contraception should be used during therapy.      Pharmacist Tracking Tool  Reason For Outreach: Embedded Pharmacist  Demographics:  Intervention Method: ZettaCorehart Message  Type of Intervention: New  Topics Addressed: Obesity  Pharmacologic Interventions: N/A  Non-Pharmacologic Interventions: Care coordination and Medication/Device education  Time:  Direct Patient Care:  10  mins  Care Coordination:  10  mins  Recommendation Recipient: N/A  Outcome: N/A

## 2023-12-18 ENCOUNTER — TELEPHONE (OUTPATIENT)
Dept: FAMILY MEDICINE CLINIC | Facility: CLINIC | Age: 28
End: 2023-12-18

## 2023-12-18 DIAGNOSIS — E66.01 MORBID OBESITY WITH BMI OF 40.0-44.9, ADULT (HCC): Primary | ICD-10-CM

## 2023-12-18 DIAGNOSIS — R73.01 IMPAIRED FASTING BLOOD SUGAR: ICD-10-CM

## 2023-12-18 NOTE — TELEPHONE ENCOUNTER
Patient  sent message back and was able to find Wegovy in stock. Rx sent for Wegovy starting dose per CPA agreement.

## 2023-12-18 NOTE — TELEPHONE ENCOUNTER
Patient has been watching for [facility] in [place] to get Saxenda in stock, she said they have one box in so if you can send the script over to [facility] in [place] for her

## 2023-12-18 NOTE — TELEPHONE ENCOUNTER
Patient unable to get Wegovy due to medication shortages. She is interested in Zpebound. Prior auth submitted via cover my meds Key: [id number]. Awaiting outcome    Pharmacist Tracking Tool  Reason For Outreach: Embedded Pharmacist  Demographics:  Intervention Method: Chart Review  Type of Intervention: Follow-Up  Topics Addressed: Obesity  Pharmacologic Interventions: N/A  Non-Pharmacologic Interventions: Care coordination  Time:  Direct Patient Care:  5  mins  Care Coordination:  10  mins  Recommendation Recipient: N/A  Outcome: N/A

## 2023-12-19 ENCOUNTER — TELEPHONE (OUTPATIENT)
Dept: FAMILY MEDICINE CLINIC | Facility: CLINIC | Age: 28
End: 2023-12-19

## 2023-12-19 DIAGNOSIS — R73.01 IMPAIRED FASTING BLOOD SUGAR: ICD-10-CM

## 2023-12-19 DIAGNOSIS — E66.01 MORBID OBESITY WITH BMI OF 40.0-44.9, ADULT (HCC): ICD-10-CM

## 2023-12-19 NOTE — TELEPHONE ENCOUNTER
Prior auth first submitted for Zepbound. Awaiting outcome of Zepbound. If denied, then will complete the prior auth for Saxenda.

## 2023-12-19 NOTE — TELEPHONE ENCOUNTER
Prior authorization needed for patient's Saxenda 18 MG/3ML Pen-injectors.    Key Code: [id number]  Patient last name: Rebecca   : 1995

## 2023-12-21 NOTE — TELEPHONE ENCOUNTER
Prior auth submitted via cover my meds for Saxenda Key: [id number] . Awaiting outcome    Pharmacist Tracking Tool  Reason For Outreach: Embedded Pharmacist  Demographics:  Intervention Method: Chart Review  Type of Intervention: Follow-Up  Topics Addressed: Obesity  Pharmacologic Interventions: N/A  Non-Pharmacologic Interventions: Chart update  Time:  Direct Patient Care:  0  mins  Care Coordination:  10  mins  Recommendation Recipient: N/A  Outcome: N/A

## 2023-12-26 ENCOUNTER — TELEMEDICINE (OUTPATIENT)
Dept: FAMILY MEDICINE CLINIC | Facility: CLINIC | Age: 28
End: 2023-12-26
Payer: COMMERCIAL

## 2023-12-26 VITALS — HEIGHT: 68 IN | WEIGHT: 275.2 LBS | BODY MASS INDEX: 41.71 KG/M2

## 2023-12-26 DIAGNOSIS — R05.8 OTHER COUGH: Primary | ICD-10-CM

## 2023-12-26 PROCEDURE — 99213 OFFICE O/P EST LOW 20 MIN: CPT | Performed by: FAMILY MEDICINE

## 2023-12-26 RX ORDER — CYCLOBENZAPRINE HCL 10 MG
10 TABLET ORAL 3 TIMES DAILY PRN
COMMUNITY
Start: 2023-12-15

## 2023-12-26 RX ORDER — AZITHROMYCIN 250 MG/1
TABLET, FILM COATED ORAL
Qty: 6 TABLET | Refills: 0 | Status: SHIPPED | OUTPATIENT
Start: 2023-12-26 | End: 2023-12-30

## 2023-12-26 RX ORDER — PREDNISONE 10 MG/1
TABLET ORAL DAILY
Qty: 30 TABLET | Refills: 0 | Status: SHIPPED | OUTPATIENT
Start: 2023-12-26 | End: 2024-01-06

## 2023-12-26 NOTE — PROGRESS NOTES
Virtual Regular Visit    Verification of patient location:    Patient is located at Home in the following state in which I hold an active license [place]      Assessment/Plan:    Problem List Items Addressed This Visit    None  Visit Diagnoses       Other cough    -  Primary    start prednisone    Relevant Medications    azithromycin (ZITHROMAX) 250 mg tablet    predniSONE 10 mg tablet                                                                                                                      BMI Counseling: Body mass index is 41.84 kg/m². The BMI is above normal. Nutrition recommendations include decreasing portion sizes and moderation in carbohydrate intake. Rationale for BMI follow-up plan is due to patient being overweight or obese.         Reason for visit is   Chief Complaint   Patient presents with    Care Gap Request     Hep C and hIV Screening   Pneumo vaccine   Pt Poc  BMI Follow up   Flu Vaccine   Pap due     Cold Like Symptoms     Started 12/24/23  Does not have covid test       Fever    Sore Throat    Cough    Fatigue    Generalized Body Aches    Headache    Shortness of Breath        Encounter provider Robert Budinetz, MD    Provider located at [facility]  [address] [place] [id number]      Recent Visits  Date Type Provider Dept   12/19/23 Telephone Shayla Soto [facility]   Showing recent visits within past 7 days and meeting all other requirements  Today's Visits  Date Type Provider Dept   12/26/23 Telemedicine Robert Budinetz, MD [facility] [place]   Showing today's visits and meeting all other requirements  Future Appointments  No visits were found meeting these conditions.  Showing future appointments within next 150 days and meeting all other requirements       The patient was identified by name and date of birth. Sherlysmitha Fernandez was informed that this is a telemedicine visit and that the visit is being  conducted through the [email or web address] platform. She agrees to proceed..  My office door was closed. No one else was in the room.  She acknowledged consent and understanding of privacy and security of the video platform. The patient has agreed to participate and understands they can discontinue the visit at any time.    Patient is aware this is a billable service.     Jose Roberto Fernandez is a 28 y.o. female see [facility] .      Patient is not feeling well for 3 to 4 days she has fever fatigue body aches muscle aches and joint pain bad sore throat and a cough with mucus production and nasal congestion.  She is using her inhaler.  She did not have a COVID test at home.  Almost everybody in her family seems to have been sick recently.  Her grandparents did have COVID but no one else had documented COVID.    Fever  Associated symptoms include coughing, fatigue, headaches and a sore throat.   Sore Throat   Associated symptoms include coughing, headaches and shortness of breath.   Cough  Associated symptoms include headaches, a sore throat and shortness of breath.   Fatigue  Associated symptoms include coughing, fatigue, headaches and a sore throat.   Generalized Body Aches  Associated symptoms include headaches, a sore throat, fatigue, coughing and shortness of breath.   Headache  Shortness of Breath  Associated symptoms include coughing, fatigue and a sore throat.        Past Medical History:   Diagnosis Date    Allergies     Anxiety     Asthma     exercise induced    Endometriosis     History of severe acute respiratory syndrome coronavirus 2 (SARS-CoV-2) disease 07/21/2022    Note: 1/2022    Peptic ulcer     PONV (postoperative nausea and vomiting)     Postviral syndrome 10/17/2022       Past Surgical History:   Procedure Laterality Date    ADENOIDECTOMY      APPENDECTOMY      BONE TUMOR EXCISION Left 3/29/2023    Procedure: REMOVAL TALUS BONE TUMOR;  Surgeon: Drake Sotomayor DO;  Location: [facility];  Service:  "Orthopedics    EGD      IR BIOPSY BONE  2023    TONSILLECTOMY      WISDOM TOOTH EXTRACTION         Current Outpatient Medications   Medication Sig Dispense Refill    albuterol (Ventolin HFA) 90 mcg/act inhaler Inhale 2 puffs every 6 (six) hours as needed for wheezing 18 g 3    ALPRAZolam (XANAX) 0.25 mg tablet Take 1 tablet (0.25 mg total) by mouth 3 (three) times a day as needed for anxiety 20 tablet 1    azithromycin (ZITHROMAX) 250 mg tablet Take 2 tablets today then 1 tablet daily x 4 days 6 tablet 0    cholecalciferol (VITAMIN D3) 1,000 units tablet Take 1,000 Units by mouth daily      Coenzyme Q10 10 MG capsule Take 10 mg by mouth daily      cyclobenzaprine (FLEXERIL) 10 mg tablet Take 10 mg by mouth 3 (three) times a day as needed      fluticasone (FLONASE) 50 mcg/act nasal spray 2 sprays into each nostril daily      metFORMIN (GLUCOPHAGE-XR) 500 mg 24 hr tablet Take 1 tablet (500 mg total) by mouth 2 (two) times a day with meals 60 tablet 1    Omega-3 Fatty Acids (fish oil) 1,000 mg Take 1,000 mg by mouth daily      predniSONE 10 mg tablet Take 4 tablets (40 mg total) by mouth daily for 3 days, THEN 3 tablets (30 mg total) daily for 3 days, THEN 2 tablets (20 mg total) daily for 3 days, THEN 1 tablet (10 mg total) daily for 3 days. 30 tablet 0    Prenatal Multivit-Min-Fe-FA (PRE- PO) Take by mouth in the morning       No current facility-administered medications for this visit.        Allergies   Allergen Reactions    Sulfa Antibiotics Hives       Review of Systems   Constitutional:  Positive for fatigue.   HENT:  Positive for sore throat.    Respiratory:  Positive for cough and shortness of breath.    Neurological:  Positive for headaches.       Video Exam    Vitals:    23 1330   Weight: 125 kg (275 lb 3.2 oz)   Height: 5' 8\" (1.727 m)       Physical Exam     Visit Time  Total Visit Duration: 10 min        "

## 2024-01-03 DIAGNOSIS — R63.5 WEIGHT GAIN: Primary | ICD-10-CM

## 2024-01-03 RX ORDER — TIRZEPATIDE 2.5 MG/.5ML
2.5 INJECTION, SOLUTION SUBCUTANEOUS WEEKLY
Qty: 2 ML | Refills: 0 | Status: SHIPPED | OUTPATIENT
Start: 2024-01-03 | End: 2024-01-31

## 2024-01-03 NOTE — TELEPHONE ENCOUNTER
Patient states that [facility] in [place] has Zepbound in stock. She will be paying cash price for it using the  discount card. Rx pended for starting dose for weight loss if you agree with plan. Thanks

## 2024-01-05 ENCOUNTER — OFFICE VISIT (OUTPATIENT)
Dept: OBGYN CLINIC | Facility: CLINIC | Age: 29
End: 2024-01-05
Payer: COMMERCIAL

## 2024-01-05 ENCOUNTER — APPOINTMENT (OUTPATIENT)
Dept: RADIOLOGY | Facility: MEDICAL CENTER | Age: 29
End: 2024-01-05
Payer: COMMERCIAL

## 2024-01-05 ENCOUNTER — TELEPHONE (OUTPATIENT)
Dept: FAMILY MEDICINE CLINIC | Facility: CLINIC | Age: 29
End: 2024-01-05

## 2024-01-05 VITALS
DIASTOLIC BLOOD PRESSURE: 82 MMHG | HEIGHT: 68 IN | BODY MASS INDEX: 41.07 KG/M2 | SYSTOLIC BLOOD PRESSURE: 127 MMHG | WEIGHT: 271 LBS | HEART RATE: 103 BPM | RESPIRATION RATE: 16 BRPM

## 2024-01-05 DIAGNOSIS — R73.01 IMPAIRED FASTING GLUCOSE: Primary | ICD-10-CM

## 2024-01-05 DIAGNOSIS — Z47.89 AFTERCARE FOLLOWING SURGERY OF THE MUSCULOSKELETAL SYSTEM: Primary | ICD-10-CM

## 2024-01-05 PROCEDURE — 73630 X-RAY EXAM OF FOOT: CPT

## 2024-01-05 PROCEDURE — 99214 OFFICE O/P EST MOD 30 MIN: CPT | Performed by: STUDENT IN AN ORGANIZED HEALTH CARE EDUCATION/TRAINING PROGRAM

## 2024-01-05 NOTE — PROGRESS NOTES
"ORTHOPEDIC ONCOLOGY POST OPERATIVE NOTE    Patient: Sherly Fernandez   Age: 28 y.o.   Sex: female  Gender: female  Date of Visit: 01/05/24    DOS: 3/29/23  Procedure performed: Removal of tumor from bone- left talus  1.  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  Open reduction internal fixation pathologic fracture left talus with cannulated screw and cement     Impression/Plan: 28 y.o. 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     S/p removal of tumor from left talus   -May wear lace up ankle brace with activity  -Pain control: PRN  Continue ice packs/elevation  Can continue compression with ACE wrap or compression stockings  -Completed DVT ppx    2. Giant cell tumor of bone  - Definitve diagnosis discussed at length.   - Recurrence may warrant use of denosumab  - Follow up protocol: XR q4 months post op for 2 years (March 2025); then q6 months for 2 years (March 2027); then annually till year 10 (March 2033)    Return in about 4 months (around 5/5/2024) for repeat imaging.  With repeat XR      Subjective:  28 y.o. female 9 months s/p Resection of tumor of left talus       Pain/Complaints: no pain at rest, increasing volume of exercises to single-foot loading on operative side   Numbness/weakness extremity: Within normal limits   Mild swelling   Physical Therapy Progress: Improving   Eating/Drinking improving. Bowel/Bladder: WNL   Denies fever/chills, numbness/tingling, injury/trauma, night sweats    Physical Exam:  Height: 5' 8\" (172.7 cm)  Weight - Scale: 123 kg (271 lb)  BMI (Calculated): 41.2  BSA (Calculated - m2): 2.33 sq meters       Vitals:    01/05/24 0815   BP: 127/82   Pulse: 103   Resp: 16     Body mass index is 41.21 kg/m².    General: alert and " oriented; well nourished/well developed; no apparent distress.  Extremity: left ankle with no swelling throughout  Motor/Senstation: intact   Brisk cap refill  Calf: bilateral calves soft, nontender    Labs: N/A    Pathology:   Final Diagnosis   A. Bone, Left Talus Tumor, Resection:  - Consistent with giant cell tumor of bone. See note.  - No evidence of malignant transformation.     Radiology: X-ray of left foot    Xray of left foot taken on 1/5/24 were reviewed and showed hardware intact with no signs of loosening and maintained anatomical alignment, no osteolysis, no evidence of talar neck fracture, no evidence of tumor recurrence. Mild talar beaking with well-preserved talo-navicular joint space    Owen CHANEY PA-C, scribed this note in conjunction with and in the presence of Dr. Drake Sotomayor DO, who performed parts of the services as described in this documentation

## 2024-01-05 NOTE — TELEPHONE ENCOUNTER
Patient has upcoming apt on Wed she is asking for annual labwork orders to be placed and also for an A1C.

## 2024-01-08 ENCOUNTER — APPOINTMENT (OUTPATIENT)
Dept: LAB | Facility: CLINIC | Age: 29
End: 2024-01-08
Payer: COMMERCIAL

## 2024-01-08 DIAGNOSIS — R73.01 IMPAIRED FASTING GLUCOSE: ICD-10-CM

## 2024-01-08 LAB
ALBUMIN SERPL BCP-MCNC: 4.2 G/DL (ref 3.5–5)
ALP SERPL-CCNC: 82 U/L (ref 34–104)
ALT SERPL W P-5'-P-CCNC: 24 U/L (ref 7–52)
ANION GAP SERPL CALCULATED.3IONS-SCNC: 8 MMOL/L
AST SERPL W P-5'-P-CCNC: 18 U/L (ref 13–39)
BASOPHILS # BLD AUTO: 0.05 THOUSANDS/ÂΜL (ref 0–0.1)
BASOPHILS NFR BLD AUTO: 0 % (ref 0–1)
BILIRUB SERPL-MCNC: 0.43 MG/DL (ref 0.2–1)
BUN SERPL-MCNC: 11 MG/DL (ref 5–25)
CALCIUM SERPL-MCNC: 9.3 MG/DL (ref 8.4–10.2)
CHLORIDE SERPL-SCNC: 106 MMOL/L (ref 96–108)
CHOLEST SERPL-MCNC: 153 MG/DL
CO2 SERPL-SCNC: 27 MMOL/L (ref 21–32)
CREAT SERPL-MCNC: 0.69 MG/DL (ref 0.6–1.3)
EOSINOPHIL # BLD AUTO: 0.14 THOUSAND/ÂΜL (ref 0–0.61)
EOSINOPHIL NFR BLD AUTO: 1 % (ref 0–6)
ERYTHROCYTE [DISTWIDTH] IN BLOOD BY AUTOMATED COUNT: 12.9 % (ref 11.6–15.1)
EST. AVERAGE GLUCOSE BLD GHB EST-MCNC: 126 MG/DL
GFR SERPL CREATININE-BSD FRML MDRD: 118 ML/MIN/1.73SQ M
GLUCOSE P FAST SERPL-MCNC: 98 MG/DL (ref 65–99)
HBA1C MFR BLD: 6 %
HCT VFR BLD AUTO: 42.8 % (ref 34.8–46.1)
HDLC SERPL-MCNC: 32 MG/DL
HGB BLD-MCNC: 13.9 G/DL (ref 11.5–15.4)
IMM GRANULOCYTES # BLD AUTO: 0.06 THOUSAND/UL (ref 0–0.2)
IMM GRANULOCYTES NFR BLD AUTO: 0 % (ref 0–2)
LDLC SERPL CALC-MCNC: 85 MG/DL (ref 0–100)
LYMPHOCYTES # BLD AUTO: 3.91 THOUSANDS/ÂΜL (ref 0.6–4.47)
LYMPHOCYTES NFR BLD AUTO: 28 % (ref 14–44)
MCH RBC QN AUTO: 27.5 PG (ref 26.8–34.3)
MCHC RBC AUTO-ENTMCNC: 32.5 G/DL (ref 31.4–37.4)
MCV RBC AUTO: 85 FL (ref 82–98)
MONOCYTES # BLD AUTO: 1.3 THOUSAND/ÂΜL (ref 0.17–1.22)
MONOCYTES NFR BLD AUTO: 9 % (ref 4–12)
NEUTROPHILS # BLD AUTO: 8.59 THOUSANDS/ÂΜL (ref 1.85–7.62)
NEUTS SEG NFR BLD AUTO: 62 % (ref 43–75)
NRBC BLD AUTO-RTO: 0 /100 WBCS
PLATELET # BLD AUTO: 436 THOUSANDS/UL (ref 149–390)
PMV BLD AUTO: 10 FL (ref 8.9–12.7)
POTASSIUM SERPL-SCNC: 4.4 MMOL/L (ref 3.5–5.3)
PROT SERPL-MCNC: 6.4 G/DL (ref 6.4–8.4)
RBC # BLD AUTO: 5.05 MILLION/UL (ref 3.81–5.12)
SODIUM SERPL-SCNC: 141 MMOL/L (ref 135–147)
TRIGL SERPL-MCNC: 179 MG/DL
TSH SERPL DL<=0.05 MIU/L-ACNC: 1.64 UIU/ML (ref 0.45–4.5)
WBC # BLD AUTO: 14.05 THOUSAND/UL (ref 4.31–10.16)

## 2024-01-08 PROCEDURE — 84443 ASSAY THYROID STIM HORMONE: CPT

## 2024-01-08 PROCEDURE — 36415 COLL VENOUS BLD VENIPUNCTURE: CPT

## 2024-01-08 PROCEDURE — 80061 LIPID PANEL: CPT

## 2024-01-08 PROCEDURE — 85025 COMPLETE CBC W/AUTO DIFF WBC: CPT

## 2024-01-08 PROCEDURE — 83036 HEMOGLOBIN GLYCOSYLATED A1C: CPT

## 2024-01-08 PROCEDURE — 80053 COMPREHEN METABOLIC PANEL: CPT

## 2024-01-09 ENCOUNTER — RA CDI HCC (OUTPATIENT)
Dept: OTHER | Facility: HOSPITAL | Age: 29
End: 2024-01-09

## 2024-01-10 ENCOUNTER — OFFICE VISIT (OUTPATIENT)
Dept: FAMILY MEDICINE CLINIC | Facility: CLINIC | Age: 29
End: 2024-01-10
Payer: COMMERCIAL

## 2024-01-10 VITALS
HEART RATE: 99 BPM | SYSTOLIC BLOOD PRESSURE: 128 MMHG | WEIGHT: 273.2 LBS | DIASTOLIC BLOOD PRESSURE: 76 MMHG | HEIGHT: 68 IN | OXYGEN SATURATION: 98 % | BODY MASS INDEX: 41.4 KG/M2

## 2024-01-10 DIAGNOSIS — F41.1 GENERALIZED ANXIETY DISORDER WITH PANIC ATTACKS: Primary | ICD-10-CM

## 2024-01-10 DIAGNOSIS — D72.820 LYMPHOCYTOSIS: ICD-10-CM

## 2024-01-10 DIAGNOSIS — F41.0 GENERALIZED ANXIETY DISORDER WITH PANIC ATTACKS: Primary | ICD-10-CM

## 2024-01-10 DIAGNOSIS — R73.01 IMPAIRED FASTING GLUCOSE: ICD-10-CM

## 2024-01-10 PROCEDURE — 99214 OFFICE O/P EST MOD 30 MIN: CPT | Performed by: FAMILY MEDICINE

## 2024-01-10 RX ORDER — MAGNESIUM GLYCINATE 100 MG
210 CAPSULE ORAL DAILY
COMMUNITY

## 2024-01-10 NOTE — PROGRESS NOTES
"Assessment/Plan:       1. Generalized anxiety disorder with panic attacks  Assessment & Plan:  Continue prn xanax      2. Impaired fasting glucose  Comments:  secondary to prednisone and being ill  continue metformin    3. Lymphocytosis  Comments:  repeat cbc  she was on prednisone  Orders:  -     CBC and differential; Future          Subjective:      Patient ID: Sherly Donald is a 28 y.o. female.    Tatiana is doing well she is here to follow-up on anxiety everything is going really well.  She uses Xanax as needed she has not had the dizzy spells like in the beginning.  She had blood work when she was not feeling well and status post prednisone so she had elevated A1c for her and an elevated white blood cell count which we are going to repeat.  She is focusing on trying to lose weight through diet exercise and she just started an injectable for this.        The following portions of the patient's history were reviewed and updated as appropriate: allergies, current medications, past family history, past medical history, past social history, past surgical history, and problem list.    Review of Systems   Respiratory: Negative.     Cardiovascular: Negative.    Gastrointestinal: Negative.          Objective:      /76 (BP Location: Right arm, Patient Position: Sitting, Cuff Size: Large)   Pulse 99   Ht 5' 8\" (1.727 m)   Wt 124 kg (273 lb 3.2 oz)   SpO2 98%   BMI 41.54 kg/m²          Physical Exam  Vitals and nursing note reviewed.   Constitutional:       Appearance: Normal appearance.   HENT:      Head: Normocephalic and atraumatic.      Nose: Nose normal.      Mouth/Throat:      Mouth: Mucous membranes are moist.   Eyes:      Extraocular Movements: Extraocular movements intact.      Pupils: Pupils are equal, round, and reactive to light.   Cardiovascular:      Rate and Rhythm: Normal rate and regular rhythm.      Pulses: Normal pulses.   Pulmonary:      Effort: Pulmonary effort is normal.      Breath " sounds: Normal breath sounds.   Abdominal:      General: Bowel sounds are normal.      Palpations: Abdomen is soft.   Musculoskeletal:      Cervical back: Normal range of motion.   Skin:     General: Skin is warm and dry.      Capillary Refill: Capillary refill takes less than 2 seconds.   Neurological:      General: No focal deficit present.      Mental Status: She is alert.   Psychiatric:         Mood and Affect: Mood normal.

## 2024-01-22 ENCOUNTER — TELEPHONE (OUTPATIENT)
Dept: FAMILY MEDICINE CLINIC | Facility: CLINIC | Age: 29
End: 2024-01-22

## 2024-01-22 DIAGNOSIS — E66.01 MORBID OBESITY WITH BMI OF 40.0-44.9, ADULT (HCC): Primary | ICD-10-CM

## 2024-01-22 RX ORDER — TIRZEPATIDE 5 MG/.5ML
5 INJECTION, SOLUTION SUBCUTANEOUS WEEKLY
Qty: 2 ML | Refills: 0 | Status: SHIPPED | OUTPATIENT
Start: 2024-01-22 | End: 2024-02-19

## 2024-01-24 ENCOUNTER — TELEPHONE (OUTPATIENT)
Age: 29
End: 2024-01-24

## 2024-01-24 NOTE — TELEPHONE ENCOUNTER
PA for tirzepatide (Zepbound) 5 mg/0.5 mL auto-injector     Submitted via  [x]CMM-KEY [id number]  []Surescripts-Case ID #   []Faxed to plan   []Other website   []Phone call Case ID #     Office notes sent, clinical questions answered. Awaiting determination

## 2024-01-25 NOTE — TELEPHONE ENCOUNTER
PA for tirzepatide (Zepbound) 5 mg/0.5 mL auto-injector  Denied    Reason:            Message sent to provider pool Yes    Denial letter scanned into Media Yes    Appeal started No

## (undated) DEVICE — DRESSING MEPILEX AG BORDER POST-OP 4 X 6 IN

## (undated) DEVICE — BIT DRILL CANN 3MM

## (undated) DEVICE — SPLINT ORTHO-GLASS 3IN X 15FT

## (undated) DEVICE — DRAPE C-ARM X-RAY

## (undated) DEVICE — CUFF TOURNIQUET 30 X 4 IN QUICK CONNECT DISP 1BLA

## (undated) DEVICE — 3M™ STERI-DRAPE™ U-DRAPE 1015: Brand: STERI-DRAPE™

## (undated) DEVICE — INTENDED FOR TISSUE SEPARATION, AND OTHER PROCEDURES THAT REQUIRE A SHARP SURGICAL BLADE TO PUNCTURE OR CUT.: Brand: BARD-PARKER ® CARBON RIB-BACK BLADES

## (undated) DEVICE — GLOVE SRG BIOGEL 7.5

## (undated) DEVICE — ABC® BEND-A-BEAM® HANDPIECE HANDCONTROL MALLEABLE HANDPIECE, 6" (15.2 CM): Brand: ABC® BEND-A-BEAM®

## (undated) DEVICE — COBAN 6 IN STERILE

## (undated) DEVICE — 4.0 MM X 2.35 MM X 70.0 MM OVAL CARBIDE BUR, 8 FLUTE

## (undated) DEVICE — 10FR FRAZIER SUCTION HANDLE: Brand: CARDINAL HEALTH

## (undated) DEVICE — GLOVE INDICATOR PI UNDERGLOVE SZ 7 BLUE

## (undated) DEVICE — SUT VICRYL 1 CT-1 27 IN J261H

## (undated) DEVICE — SUT MONOCRYL 2-0 CT-1 36 IN Y945H

## (undated) DEVICE — GLOVE SRG BIOGEL 8

## (undated) DEVICE — SPECIMEN CONTAINER STERILE PEEL PACK

## (undated) DEVICE — 3M™ STERI-STRIP™ REINFORCED ADHESIVE SKIN CLOSURES, R1547, 1/2 IN X 4 IN (12 MM X 100 MM), 6 STRIPS/ENVELOPE: Brand: 3M™ STERI-STRIP™

## (undated) DEVICE — PADDING CAST 6IN COTTON STRL

## (undated) DEVICE — ACE WRAP 6 IN UNSTERILE

## (undated) DEVICE — K-WIRE 1.6 X 220MM TROCAR TIP
Type: IMPLANTABLE DEVICE | Site: FOOT | Status: NON-FUNCTIONAL
Removed: 2023-03-29

## (undated) DEVICE — DISPOSABLE OR TOWEL: Brand: CARDINAL HEALTH

## (undated) DEVICE — GAUZE SPONGES,16 PLY: Brand: CURITY

## (undated) DEVICE — PLUMEPEN PRO 10FT

## (undated) DEVICE — NEEDLE HYPO 22G X 1-1/2 IN

## (undated) DEVICE — 3.1 MM X 2.35 MM X 51.0 MM ROUND CARBIDE BUR, 6 FLUTE

## (undated) DEVICE — TELFA NON-ADHERENT ABSORBENT DRESSING: Brand: TELFA

## (undated) DEVICE — SCD SEQUENTIAL COMPRESSION COMFORT SLEEVE MEDIUM KNEE LENGTH: Brand: KENDALL SCD

## (undated) DEVICE — BETHLEHEM TOTAL HIP, KIT: Brand: CARDINAL HEALTH

## (undated) DEVICE — DRAPE SHEET THREE QUARTER

## (undated) DEVICE — KERLIX BANDAGE ROLL: Brand: KERLIX

## (undated) DEVICE — GLOVE SRG BIOGEL 6.5

## (undated) DEVICE — SYRINGE 20ML LL

## (undated) DEVICE — 3000CC GUARDIAN II: Brand: GUARDIAN